# Patient Record
Sex: MALE | Race: WHITE | Employment: UNEMPLOYED | URBAN - METROPOLITAN AREA
[De-identification: names, ages, dates, MRNs, and addresses within clinical notes are randomized per-mention and may not be internally consistent; named-entity substitution may affect disease eponyms.]

---

## 2018-05-27 PROBLEM — R07.9 CHEST PAIN: Status: ACTIVE | Noted: 2018-05-27

## 2018-06-13 ENCOUNTER — OFFICE VISIT (OUTPATIENT)
Dept: ORTHOPEDIC SURGERY | Age: 43
End: 2018-06-13

## 2018-06-13 VITALS
DIASTOLIC BLOOD PRESSURE: 78 MMHG | BODY MASS INDEX: 28.12 KG/M2 | HEIGHT: 66 IN | SYSTOLIC BLOOD PRESSURE: 113 MMHG | WEIGHT: 175 LBS | HEART RATE: 87 BPM

## 2018-06-13 DIAGNOSIS — M50.00 HNP (HERNIATED NUCLEUS PULPOSUS) WITH MYELOPATHY, CERVICAL: Primary | ICD-10-CM

## 2018-06-13 PROCEDURE — 99243 OFF/OP CNSLTJ NEW/EST LOW 30: CPT | Performed by: PHYSICIAN ASSISTANT

## 2018-06-13 RX ORDER — DEXAMETHASONE 6 MG/1
6 TABLET ORAL 2 TIMES DAILY
Qty: 12 TABLET | Refills: 0 | Status: SHIPPED | OUTPATIENT
Start: 2018-06-13 | End: 2018-06-19

## 2018-06-13 RX ORDER — GABAPENTIN 300 MG/1
300 CAPSULE ORAL 3 TIMES DAILY
Qty: 90 CAPSULE | Refills: 0 | Status: SHIPPED | OUTPATIENT
Start: 2018-06-13 | End: 2018-07-31

## 2018-06-20 ENCOUNTER — TELEPHONE (OUTPATIENT)
Dept: ORTHOPEDIC SURGERY | Age: 43
End: 2018-06-20

## 2018-06-27 ENCOUNTER — TELEPHONE (OUTPATIENT)
Dept: ORTHOPEDIC SURGERY | Age: 43
End: 2018-06-27

## 2018-06-29 ENCOUNTER — TELEPHONE (OUTPATIENT)
Dept: ORTHOPEDIC SURGERY | Age: 43
End: 2018-06-29

## 2018-06-30 ENCOUNTER — HOSPITAL ENCOUNTER (OUTPATIENT)
Dept: MRI IMAGING | Age: 43
Discharge: HOME | End: 2018-06-30
Attending: PHYSICIAN ASSISTANT | Admitting: PHYSICIAN ASSISTANT

## 2018-06-30 DIAGNOSIS — M50.00 CERVICAL DISC DISORDER WITH MYELOPATHY OF CERVICAL REGION: ICD-10-CM

## 2018-06-30 DIAGNOSIS — M50.00 HNP (HERNIATED NUCLEUS PULPOSUS) WITH MYELOPATHY, CERVICAL: ICD-10-CM

## 2018-07-02 ENCOUNTER — TELEPHONE (OUTPATIENT)
Dept: ORTHOPEDIC SURGERY | Age: 43
End: 2018-07-02

## 2018-07-02 NOTE — TELEPHONE ENCOUNTER
Spoke with wife and she stated on his left arm he has numbness, tingling and swelling. She stated that she will discuss info with him tomorrow at the appt.

## 2018-07-02 NOTE — TELEPHONE ENCOUNTER
He has a left disc protrusion at C5-6 which is likely contributing to his left arm symptoms. I think he could consider an epidural injection or come in to discuss surgical options with Dr. Aminah Giordano.  Thanks

## 2018-07-03 ENCOUNTER — OFFICE VISIT (OUTPATIENT)
Dept: ORTHOPEDIC SURGERY | Age: 43
End: 2018-07-03

## 2018-07-03 VITALS
HEART RATE: 96 BPM | WEIGHT: 160 LBS | HEIGHT: 66 IN | DIASTOLIC BLOOD PRESSURE: 84 MMHG | BODY MASS INDEX: 25.71 KG/M2 | SYSTOLIC BLOOD PRESSURE: 151 MMHG

## 2018-07-03 DIAGNOSIS — M50.10 HERNIATION OF CERVICAL INTERVERTEBRAL DISC WITH RADICULOPATHY: Primary | ICD-10-CM

## 2018-07-03 PROCEDURE — 99213 OFFICE O/P EST LOW 20 MIN: CPT | Performed by: ORTHOPAEDIC SURGERY

## 2018-07-03 RX ORDER — HYDROCODONE BITARTRATE AND ACETAMINOPHEN 5; 325 MG/1; MG/1
1 TABLET ORAL EVERY 8 HOURS PRN
Qty: 21 TABLET | Refills: 0 | Status: SHIPPED | OUTPATIENT
Start: 2018-07-03 | End: 2018-08-01 | Stop reason: SDUPTHER

## 2018-07-03 NOTE — PROGRESS NOTES
History of present illness:   Mr. Magaly Singh is a pleasant 41 y.o. old male with a PMH of vertigo kindly referred by Salud Baptiste PA-C for consultation regarding Mr. Richerd Collet Mulle's neck, and left arm pain. He states the pain began insidiously about 8 weeks ago. His pain has steadily increased since then. He rates his neck pain 10/10 and shoulder and arm pain 10/10. He describes the pain as aching and stabbing. The arm pain radiates to his left shoulder blade and arm. He notes numbness and tingling in a C7 distribution. He notes weakness of his left arm. He denies, lower extremity symptoms, gait abnormality and bowel or bladder dysfunction. The pain moderately interferes with his sleep. He has tried steroids, Neurontin, chiropractic care, ice and heat. He takes Naproxen and Robaxin. He also notes retro-orbital headaches, occasional numbness in his legs and swelling this left hand    General Exam:  He is well-developed and well-nourished, is in obvious pain and alert and oriented to person, place, and time. He demonstrates appropriate mood and affect. He walks with a normal gait. HEENT:   His cervical flexion, extension, and axial rotation are moderately reduced with pain. His skin is warm and dry. He has no tenderness over his cervical spine and no obvious muscle spasm. The skin over his cervical spine is normal without surgical scar. Upper extremities:  He has 4-/5 left  strength, finger intrinsics and biceps, otherwise 5/5 strength of his interosseous muscles, wrist dorsiflexors and volarflexors, biceps, triceps, deltoids, and internal and external rotators of his shoulders, bilaterally. His biceps, triceps, bracheoradialis, quadriceps and achilles reflexes are 2+, bilaterally. Sensation is intact to light touchfrom C6 to C8. He has no clonus and negative Hargrove's bilaterally. He has swelling over the dorsum of his left hand without tenderness.   He has painless range of motion of his left wrist. Lower extremities:  Normal DTR knees, no clonus. Imaging:   I reviewed AP and lateral xray images of his cervical spine from 5/28/18. They show loss of normal cervical lordosis and mild spondylosis. I reviewed MRI images of the cervical spine in the office today. 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 He understands these risks and wishes to proceed with surgery. His questions were answered.

## 2018-07-09 ENCOUNTER — TELEPHONE (OUTPATIENT)
Dept: ORTHOPEDIC SURGERY | Age: 43
End: 2018-07-09

## 2018-07-09 NOTE — TELEPHONE ENCOUNTER
WORKING ON FMLA AND FITNESS FOR [employer] FOR[employer] FOR [employer]. WAITING FOR A REPLY FROM [employer] ([employer]) REGARDING LENGTH OF TIME OFF WORK FROM SURGERY.

## 2018-07-16 ENCOUNTER — TELEPHONE (OUTPATIENT)
Dept: ORTHOPEDIC SURGERY | Age: 43
End: 2018-07-16

## 2018-07-23 ENCOUNTER — TELEPHONE (OUTPATIENT)
Dept: ORTHOPEDIC SURGERY | Age: 43
End: 2018-07-23

## 2018-07-25 ENCOUNTER — TELEPHONE (OUTPATIENT)
Dept: ORTHOPEDIC SURGERY | Age: 43
End: 2018-07-25

## 2018-07-25 NOTE — TELEPHONE ENCOUNTER
Spoke with patient's wife and let her know that Dr. Osmani Dill approved for him to return to work with restrictions before surgery. He also said that we can fill out his paperwork for [facility] to fill the gap from him seeing [place] until Dr. Osmani Dill saw the patient to determine that surgery was necessary. Coleman Minijamari can you take care of this for me? I also added a letter in his file for him to return with restrictions before surgery.

## 2018-07-26 ENCOUNTER — TELEPHONE (OUTPATIENT)
Dept: ORTHOPEDIC SURGERY | Age: 43
End: 2018-07-26

## 2018-07-31 ENCOUNTER — OFFICE VISIT (OUTPATIENT)
Dept: CARDIOLOGY CLINIC | Age: 43
End: 2018-07-31

## 2018-07-31 ENCOUNTER — TELEPHONE (OUTPATIENT)
Dept: ORTHOPEDIC SURGERY | Age: 43
End: 2018-07-31

## 2018-07-31 VITALS
SYSTOLIC BLOOD PRESSURE: 118 MMHG | HEART RATE: 108 BPM | BODY MASS INDEX: 27.16 KG/M2 | OXYGEN SATURATION: 97 % | DIASTOLIC BLOOD PRESSURE: 80 MMHG | HEIGHT: 66 IN | WEIGHT: 169 LBS

## 2018-07-31 DIAGNOSIS — R01.1 MURMUR: ICD-10-CM

## 2018-07-31 DIAGNOSIS — M50.10 HERNIATION OF CERVICAL INTERVERTEBRAL DISC WITH RADICULOPATHY: ICD-10-CM

## 2018-07-31 DIAGNOSIS — Z72.0 NICOTINE ABUSE: ICD-10-CM

## 2018-07-31 DIAGNOSIS — Z01.810 PREOPERATIVE CARDIOVASCULAR EXAMINATION: Primary | ICD-10-CM

## 2018-07-31 PROCEDURE — 99204 OFFICE O/P NEW MOD 45 MIN: CPT | Performed by: INTERNAL MEDICINE

## 2018-07-31 PROCEDURE — 93000 ELECTROCARDIOGRAM COMPLETE: CPT | Performed by: INTERNAL MEDICINE

## 2018-07-31 NOTE — PATIENT INSTRUCTIONS
Patient Education        Heart-Healthy Diet: Care Instructions  Your Care Instructions    A heart-healthy diet has lots of vegetables, fruits, nuts, beans, and whole grains, and is low in salt. It limits foods that are high in saturated fat, such as meats, cheeses, and fried foods. It may be hard to change your diet, but even small changes can lower your risk of heart attack and heart disease. Follow-up care is a key part of your treatment and safety. Be sure to make and go to all appointments, and call your doctor if you are having problems. It's also a good idea to know your test results and keep a list of the medicines you take. How can you care for yourself at home? Watch your portions  · Learn what a serving is. A \"serving\" and a \"portion\" are not always the same thing. Make sure that you are not eating larger portions than are recommended. For example, a serving of pasta is ½ cup. A serving size of meat is 2 to 3 ounces. A 3-ounce serving is about the size of a deck of cards. Measure serving sizes until you are good at [place]" them. Keep in mind that restaurants often serve portions that are 2 or 3 times the size of one serving. · To keep your energy level up and keep you from feeling hungry, eat often but in smaller portions. · Eat only the number of calories you need to stay at a healthy weight. If you need to lose weight, eat fewer calories than your body burns (through exercise and other physical activity). Eat more fruits and vegetables  · Eat a variety of fruit and vegetables every day. Dark green, deep orange, red, or yellow fruits and vegetables are especially good for you. Examples include spinach, carrots, peaches, and berries. · Keep carrots, celery, and other veggies handy for snacks. Buy fruit that is in season and store it where you can see it so that you will be tempted to eat it. · Cook dishes that have a lot of veggies in them, such as stir-fries and soups.   Limit saturated and

## 2018-07-31 NOTE — PROGRESS NOTES
[address]      Cardiology Consult    Sg Longoria [facility]  1975 July 31, 2018        Reason for Referral: Pre-operative risk assessment/murmur. CC: \"I was told I have a murmur\"     HPI:  The patient is 41 y.o. male with no known cardiac history who presents today for a pre-operative risk assessment and evaluation of a heart murmur. He is accompanied by his wife. He reports he is having surgery on Friday for a herniated disc in his neck. He does not have a PCP. He was evaluated in the ED for his neck pain and ultimately referred to orthopedics. He states he was told he had a murmur by the ED physician. He denies any cardiac sounding complaints. He denies a history of CHF, CAD, CKD, DM, or CVA. He reports a functional status of >4 mets without symptoms. He reports occasional nonexertional brief chest pains that improve without any intervention. He denies any changes in the frequency or intensity of this atypical symptom. Patient denies exertional chest pain/pressure, dyspnea at rest, BRYANT, PND, orthopnea, palpitations, lightheadedness, weight changes, LE edema, and syncope.      Past Medical History:   Diagnosis Date    Kidney stone     Murmur     Vertigo      Past Surgical History:   Procedure Laterality Date    CYSTOSCOPY      LITHOTRIPSY      WISDOM TOOTH EXTRACTION       Family History   Problem Relation Age of Onset    Heart Disease Mother         CHF    Heart Attack Father     High Blood Pressure Father     No Known Problems Sister     No Known Problems Brother     No Known Problems Maternal Aunt     No Known Problems Maternal Uncle     No Known Problems Paternal Aunt     No Known Problems Paternal Uncle     No Known Problems Maternal Grandmother     No Known Problems Maternal Grandfather     No Known Problems Paternal Grandmother     No Known Problems Paternal Grandfather     No Known Problems Other     Anesth Problems Neg Hx     Broken Bones Neg Hx     Cancer Neg Hx place, and time. He appears well-developed and well-nourished. In no acute distress. Head: Normocephalic and atraumatic. Pupils equal and round. Neck: Neck supple. No JVP or carotid bruit appreciated. No mass and no thyromegaly present. No lymphadenopathy present. Cardiovascular: Normal rate. Exam reveals no gallop and no friction rub. II/VI holosystolic murmur loudest at the apex. Pulmonary/Chest: Effort normal and breath sounds normal. No respiratory distress. He has no wheezes, rhonchi or rales. Abdominal: Soft, non-tender. Bowel sounds are normal. He exhibits no organomegaly, mass or bruit. Extremities: No edema, cyanosis, or clubbing. Pulses are 2+ radial/dorsalis pedis/posterior tibial/carotid bilaterally. Neurological: No gross cranial nerve deficit. Coordination normal.   Skin: Skin is warm and dry. There is no rash or diaphoresis. Psychiatric: He has a normal mood and affect. His speech is normal and behavior is normal.     Lab Review:   FLP:  No results found for: TRIG, HDL, LDLCALC, LDLDIRECT, LABVLDL  BUN/Creatinine:    Lab Results   Component Value Date    BUN 18 05/27/2018    CREATININE 0.8 05/27/2018       EKG Interpretation: 7/30/18. Sinus tachycardia. Right axis deviation. Nonspecific ST-T abnormality. Image Review:   None on file. Assessment/Plan:   1) Systolic murmur. Sounds consistent with mitral regurgitation but will order an echo to further assess. 2) Pre-operative risk assessment. Patient is low cardiac risk based on RCRI score of zero. Patient's risk should not preclude him from proceeding with surgery. No additional cardiac testing is required prior to surgery as he denies any unstable or progressive cardiac sounding symptoms. The echocardiogram should not delay surgery as it would be unlikely to change recommendations. 3) Nicotine Abuse. Encouraged smoking cessation but patient is in the contemplative stage. Follow up pending cardiac echo results. Encouraged patient to establish care with a PCP. Thank you very much for allowing me to participate in the care of your patient. Please do not hesitate to contact me if you have any questions. Sincerely,  Lidia Menendez.  Yanelis Gonzalez, [address] [place] [address], [address] [place] [address] [facility]  Ph: [phone]  Fax: [phone]

## 2018-07-31 NOTE — TELEPHONE ENCOUNTER
LM for patient to return my call.   I need to speak with him because I need to cancel his surgery on Friday and reschedule him for 8/15/18

## 2018-07-31 NOTE — PROGRESS NOTES
Obstructive Sleep Apnea (TREY) Screening     Patient:  Maylin Valentin    YOB: 1975      Medical Record #:  [id number]                     Date:  7/31/2018     1. Are you a loud and/or regular snorer? []  Yes       [x] No    2. Have you been observed to gasp or stop breathing during sleep? []  Yes       [x] No    3. Do you feel tired or groggy upon awakening or do you awaken with a headache?           []  Yes       [x] No    4. Are you often tired or fatigued during the wake time hours? []  Yes       [x] No    5. Do you fall asleep sitting, reading, watching TV or driving? []  Yes       [x] No    6. Do you often have problems with memory or concentration? []  Yes       [x] No    **If patient's score is ? 3 they are considered high risk for TREY. Notify the anesthesiologist of the high risk and document in focus note. Note:  If the patient's BMI is more than 35 kg m¯² , has neck circumference > 40 cm, and/or high blood pressure the risk is greater (© American Sleep Apnea Association, 2006).

## 2018-08-01 ENCOUNTER — TELEPHONE (OUTPATIENT)
Dept: ORTHOPEDIC SURGERY | Age: 43
End: 2018-08-01

## 2018-08-01 ENCOUNTER — HOSPITAL ENCOUNTER (OUTPATIENT)
Age: 43
Discharge: HOME | End: 2018-08-01
Payer: MEDICAID

## 2018-08-01 DIAGNOSIS — R01.1 MURMUR: ICD-10-CM

## 2018-08-01 DIAGNOSIS — Z01.810 PREOPERATIVE CARDIOVASCULAR EXAMINATION: ICD-10-CM

## 2018-08-01 LAB
A/G RATIO: 1.5 (ref 1.1–2.2)
ALBUMIN SERPL-MCNC: 4.1 G/DL (ref 3.4–5)
ALP BLD-CCNC: 105 U/L (ref 40–129)
ALT SERPL-CCNC: 75 U/L (ref 10–40)
ANION GAP SERPL CALCULATED.3IONS-SCNC: 12 MMOL/L (ref 3–16)
AST SERPL-CCNC: 55 U/L (ref 15–37)
BILIRUB SERPL-MCNC: 0.4 MG/DL (ref 0–1)
BUN BLDV-MCNC: 8 MG/DL (ref 7–20)
CALCIUM SERPL-MCNC: 8.9 MG/DL (ref 8.3–10.6)
CHLORIDE BLD-SCNC: 99 MMOL/L (ref 99–110)
CHOLESTEROL, FASTING: 173 MG/DL (ref 0–199)
CO2: 26 MMOL/L (ref 21–32)
CREAT SERPL-MCNC: 0.9 MG/DL (ref 0.9–1.3)
GFR AFRICAN AMERICAN: >60
GFR NON-AFRICAN AMERICAN: >60
GLOBULIN: 2.8 G/DL
GLUCOSE BLD-MCNC: 136 MG/DL (ref 70–99)
HCT VFR BLD CALC: 44.7 % (ref 40.5–52.5)
HDLC SERPL-MCNC: 28 MG/DL (ref 40–60)
HEMOGLOBIN: 14.7 G/DL (ref 13.5–17.5)
LDL CHOLESTEROL CALCULATED: 109 MG/DL
MCH RBC QN AUTO: 28.5 PG (ref 26–34)
MCHC RBC AUTO-ENTMCNC: 32.9 G/DL (ref 31–36)
MCV RBC AUTO: 86.4 FL (ref 80–100)
PDW BLD-RTO: 15.1 % (ref 12.4–15.4)
PLATELET # BLD: 363 K/UL (ref 135–450)
PMV BLD AUTO: 8.3 FL (ref 5–10.5)
POTASSIUM SERPL-SCNC: 4.2 MMOL/L (ref 3.5–5.1)
RBC # BLD: 5.17 M/UL (ref 4.2–5.9)
SODIUM BLD-SCNC: 137 MMOL/L (ref 136–145)
TOTAL PROTEIN: 6.9 G/DL (ref 6.4–8.2)
TRIGLYCERIDE, FASTING: 182 MG/DL (ref 0–150)
TSH SERPL DL<=0.05 MIU/L-ACNC: 1.2 UIU/ML (ref 0.27–4.2)
VLDLC SERPL CALC-MCNC: 36 MG/DL
WBC # BLD: 6.6 K/UL (ref 4–11)

## 2018-08-01 PROCEDURE — 80053 COMPREHEN METABOLIC PANEL: CPT

## 2018-08-01 PROCEDURE — 36415 COLL VENOUS BLD VENIPUNCTURE: CPT

## 2018-08-01 PROCEDURE — 85027 COMPLETE CBC AUTOMATED: CPT

## 2018-08-01 PROCEDURE — 80061 LIPID PANEL: CPT

## 2018-08-01 PROCEDURE — 84443 ASSAY THYROID STIM HORMONE: CPT

## 2018-08-01 RX ORDER — HYDROCODONE BITARTRATE AND ACETAMINOPHEN 5; 325 MG/1; MG/1
1 TABLET ORAL EVERY 8 HOURS PRN
Qty: 21 TABLET | Refills: 0 | Status: SHIPPED | OUTPATIENT
Start: 2018-08-01 | End: 2018-08-09

## 2018-08-01 NOTE — TELEPHONE ENCOUNTER
with Chica Rosado about our processes and how she knows all the ins and outs of getting things retro authorized and paid, the patient's wife did finally leave.

## 2018-08-02 ENCOUNTER — TELEPHONE (OUTPATIENT)
Dept: CARDIOLOGY CLINIC | Age: 43
End: 2018-08-02

## 2018-08-03 ENCOUNTER — TELEPHONE (OUTPATIENT)
Dept: ORTHOPEDIC SURGERY | Age: 43
End: 2018-08-03

## 2018-08-03 NOTE — TELEPHONE ENCOUNTER
PER  461 HE LEFT MESSAGE ON VOICEMAIL THAT STATES PATIENT IS INELIGIBLE FOR THE MONTH OF AUGUST 2018. INFORMED SCOTT AND SHE CALLED THE PATIENT. SHE STATES PATIENT MAY HAVE TO WAIT UNTIL September WHEN HE HAS BUCKEYE. INFORMED HER THAT I CAN'T START THE CASE UNTIL HE IS ELIGIBLE AND IT TAKES 15 DAYS TO PRECERT.

## 2018-08-09 ENCOUNTER — HOSPITAL ENCOUNTER (OUTPATIENT)
Dept: NON INVASIVE DIAGNOSTICS | Age: 43
Discharge: HOME | End: 2018-08-09
Attending: INTERNAL MEDICINE | Admitting: INTERNAL MEDICINE

## 2018-08-09 DIAGNOSIS — R01.1 MURMUR: ICD-10-CM

## 2018-08-09 DIAGNOSIS — R01.1 CARDIAC MURMUR: ICD-10-CM

## 2018-08-09 LAB
LV EF: 68 %
LVEF MODALITY: NORMAL

## 2018-08-10 ENCOUNTER — TELEPHONE (OUTPATIENT)
Dept: CARDIOLOGY CLINIC | Age: 43
End: 2018-08-10

## 2018-08-10 NOTE — TELEPHONE ENCOUNTER
Dr. Edmund Holt reviewed in result note. Spoke with Audra. Heart function was normal. There is probably mitral valve prolapse but difficult to say definitively based on echo quality. Regardless, no new recommendations. Follow-up in one year or sooner if issues. Will likely repeat echo in a few years even if asymptomatic. She verbalized an understanding and states he will follow up with his PCP next week.

## 2018-08-15 ENCOUNTER — ANESTHESIA (OUTPATIENT)
Dept: OPERATING ROOM | Age: 43
DRG: 310 | End: 2018-08-15
Payer: MEDICAID

## 2018-08-15 ENCOUNTER — HOSPITAL ENCOUNTER (INPATIENT)
Age: 43
LOS: 1 days | Discharge: HOME | DRG: 310 | End: 2018-08-16
Attending: ORTHOPAEDIC SURGERY | Admitting: ORTHOPAEDIC SURGERY
Payer: MEDICAID

## 2018-08-15 ENCOUNTER — HOSPITAL ENCOUNTER (OUTPATIENT)
Dept: GENERAL RADIOLOGY | Age: 43
Discharge: HOME | End: 2018-08-15

## 2018-08-15 ENCOUNTER — ANESTHESIA EVENT (OUTPATIENT)
Dept: OPERATING ROOM | Age: 43
DRG: 310 | End: 2018-08-15
Payer: MEDICAID

## 2018-08-15 VITALS — SYSTOLIC BLOOD PRESSURE: 107 MMHG | OXYGEN SATURATION: 100 % | DIASTOLIC BLOOD PRESSURE: 71 MMHG

## 2018-08-15 DIAGNOSIS — M50.20 CERVICAL DISC HERNIATION: Primary | ICD-10-CM

## 2018-08-15 DIAGNOSIS — R52 PAIN: ICD-10-CM

## 2018-08-15 PROCEDURE — 3700000001 HC ADD 15 MINUTES (ANESTHESIA): Performed by: ORTHOPAEDIC SURGERY

## 2018-08-15 PROCEDURE — 3600000014 HC SURGERY LEVEL 4 ADDTL 15MIN: Performed by: ORTHOPAEDIC SURGERY

## 2018-08-15 PROCEDURE — 6360000002 HC RX W HCPCS: Performed by: ANESTHESIOLOGY

## 2018-08-15 PROCEDURE — 0RU307Z SUPPLEMENT CERVICAL VERTEBRAL DISC WITH AUTOLOGOUS TISSUE SUBSTITUTE, OPEN APPROACH: ICD-10-PCS | Performed by: ORTHOPAEDIC SURGERY

## 2018-08-15 PROCEDURE — 7100000000 HC PACU RECOVERY - FIRST 15 MIN: Performed by: ORTHOPAEDIC SURGERY

## 2018-08-15 PROCEDURE — 2580000003 HC RX 258: Performed by: ANESTHESIOLOGY

## 2018-08-15 PROCEDURE — 2500000003 HC RX 250 WO HCPCS: Performed by: ORTHOPAEDIC SURGERY

## 2018-08-15 PROCEDURE — 2780000010 HC IMPLANT OTHER: Performed by: ORTHOPAEDIC SURGERY

## 2018-08-15 PROCEDURE — 6360000002 HC RX W HCPCS: Performed by: ORTHOPAEDIC SURGERY

## 2018-08-15 PROCEDURE — 3700000000 HC ANESTHESIA ATTENDED CARE: Performed by: ORTHOPAEDIC SURGERY

## 2018-08-15 PROCEDURE — 2500000003 HC RX 250 WO HCPCS: Performed by: NURSE ANESTHETIST, CERTIFIED REGISTERED

## 2018-08-15 PROCEDURE — 99406 BEHAV CHNG SMOKING 3-10 MIN: CPT

## 2018-08-15 PROCEDURE — 2709999900 HC NON-CHARGEABLE SUPPLY: Performed by: ORTHOPAEDIC SURGERY

## 2018-08-15 PROCEDURE — 6370000000 HC RX 637 (ALT 250 FOR IP): Performed by: ORTHOPAEDIC SURGERY

## 2018-08-15 PROCEDURE — 3209999900 FLUORO FOR SURGICAL PROCEDURES

## 2018-08-15 PROCEDURE — 2580000003 HC RX 258: Performed by: ORTHOPAEDIC SURGERY

## 2018-08-15 PROCEDURE — 1200000000 HC SEMI PRIVATE

## 2018-08-15 PROCEDURE — 72040 X-RAY EXAM NECK SPINE 2-3 VW: CPT

## 2018-08-15 PROCEDURE — 6360000002 HC RX W HCPCS: Performed by: NURSE ANESTHETIST, CERTIFIED REGISTERED

## 2018-08-15 PROCEDURE — C1713 ANCHOR/SCREW BN/BN,TIS/BN: HCPCS | Performed by: ORTHOPAEDIC SURGERY

## 2018-08-15 PROCEDURE — 2720000010 HC SURG SUPPLY STERILE: Performed by: ORTHOPAEDIC SURGERY

## 2018-08-15 PROCEDURE — 7100000001 HC PACU RECOVERY - ADDTL 15 MIN: Performed by: ORTHOPAEDIC SURGERY

## 2018-08-15 PROCEDURE — 94664 DEMO&/EVAL PT USE INHALER: CPT

## 2018-08-15 PROCEDURE — 3600000004 HC SURGERY LEVEL 4 BASE: Performed by: ORTHOPAEDIC SURGERY

## 2018-08-15 PROCEDURE — 0RT30ZZ RESECTION OF CERVICAL VERTEBRAL DISC, OPEN APPROACH: ICD-10-PCS | Performed by: ORTHOPAEDIC SURGERY

## 2018-08-15 PROCEDURE — 94150 VITAL CAPACITY TEST: CPT

## 2018-08-15 DEVICE — DISC 6972450 PRESTIGE
Type: IMPLANTABLE DEVICE | Status: FUNCTIONAL
Brand: PRESTIGE LP™

## 2018-08-15 RX ORDER — CEPHALEXIN 500 MG/1
500 CAPSULE ORAL EVERY 6 HOURS
Qty: 2 CAPSULE | Refills: 0 | Status: SHIPPED | OUTPATIENT
Start: 2018-08-15 | End: 2018-08-21

## 2018-08-15 RX ORDER — MORPHINE SULFATE 2 MG/ML
2 INJECTION, SOLUTION INTRAMUSCULAR; INTRAVENOUS
Status: DISCONTINUED | OUTPATIENT
Start: 2018-08-15 | End: 2018-08-16 | Stop reason: HOSPADM

## 2018-08-15 RX ORDER — HYDROMORPHONE HCL 110MG/55ML
PATIENT CONTROLLED ANALGESIA SYRINGE INTRAVENOUS PRN
Status: DISCONTINUED | OUTPATIENT
Start: 2018-08-15 | End: 2018-08-15 | Stop reason: SDUPTHER

## 2018-08-15 RX ORDER — SODIUM CHLORIDE 0.9 % (FLUSH) 0.9 %
10 SYRINGE (ML) INJECTION PRN
Status: DISCONTINUED | OUTPATIENT
Start: 2018-08-15 | End: 2018-08-15 | Stop reason: HOSPADM

## 2018-08-15 RX ORDER — MIDAZOLAM HYDROCHLORIDE 1 MG/ML
INJECTION INTRAMUSCULAR; INTRAVENOUS PRN
Status: DISCONTINUED | OUTPATIENT
Start: 2018-08-15 | End: 2018-08-15 | Stop reason: SDUPTHER

## 2018-08-15 RX ORDER — SODIUM CHLORIDE 0.9 % (FLUSH) 0.9 %
10 SYRINGE (ML) INJECTION EVERY 12 HOURS SCHEDULED
Status: DISCONTINUED | OUTPATIENT
Start: 2018-08-15 | End: 2018-08-15 | Stop reason: HOSPADM

## 2018-08-15 RX ORDER — BUPIVACAINE HYDROCHLORIDE AND EPINEPHRINE 2.5; 5 MG/ML; UG/ML
INJECTION, SOLUTION EPIDURAL; INFILTRATION; INTRACAUDAL; PERINEURAL PRN
Status: DISCONTINUED | OUTPATIENT
Start: 2018-08-15 | End: 2018-08-15 | Stop reason: HOSPADM

## 2018-08-15 RX ORDER — CYCLOBENZAPRINE HCL 10 MG
10 TABLET ORAL 3 TIMES DAILY PRN
Status: DISCONTINUED | OUTPATIENT
Start: 2018-08-15 | End: 2018-08-16 | Stop reason: HOSPADM

## 2018-08-15 RX ORDER — OXYCODONE HYDROCHLORIDE AND ACETAMINOPHEN 5; 325 MG/1; MG/1
1 TABLET ORAL EVERY 4 HOURS PRN
Status: DISCONTINUED | OUTPATIENT
Start: 2018-08-15 | End: 2018-08-16 | Stop reason: HOSPADM

## 2018-08-15 RX ORDER — ONDANSETRON 2 MG/ML
4 INJECTION INTRAMUSCULAR; INTRAVENOUS EVERY 6 HOURS PRN
Status: DISCONTINUED | OUTPATIENT
Start: 2018-08-15 | End: 2018-08-16 | Stop reason: HOSPADM

## 2018-08-15 RX ORDER — LIDOCAINE HYDROCHLORIDE 10 MG/ML
0.3 INJECTION, SOLUTION EPIDURAL; INFILTRATION; INTRACAUDAL; PERINEURAL
Status: DISCONTINUED | OUTPATIENT
Start: 2018-08-15 | End: 2018-08-15 | Stop reason: HOSPADM

## 2018-08-15 RX ORDER — OXYCODONE HYDROCHLORIDE AND ACETAMINOPHEN 5; 325 MG/1; MG/1
1 TABLET ORAL PRN
Status: DISCONTINUED | OUTPATIENT
Start: 2018-08-15 | End: 2018-08-15 | Stop reason: HOSPADM

## 2018-08-15 RX ORDER — LIDOCAINE HYDROCHLORIDE 20 MG/ML
INJECTION, SOLUTION EPIDURAL; INFILTRATION; INTRACAUDAL; PERINEURAL PRN
Status: DISCONTINUED | OUTPATIENT
Start: 2018-08-15 | End: 2018-08-15 | Stop reason: SDUPTHER

## 2018-08-15 RX ORDER — HYDRALAZINE HYDROCHLORIDE 20 MG/ML
5 INJECTION INTRAMUSCULAR; INTRAVENOUS EVERY 10 MIN PRN
Status: DISCONTINUED | OUTPATIENT
Start: 2018-08-15 | End: 2018-08-15 | Stop reason: HOSPADM

## 2018-08-15 RX ORDER — OXYCODONE HYDROCHLORIDE AND ACETAMINOPHEN 5; 325 MG/1; MG/1
2 TABLET ORAL EVERY 4 HOURS PRN
Status: DISCONTINUED | OUTPATIENT
Start: 2018-08-15 | End: 2018-08-16 | Stop reason: HOSPADM

## 2018-08-15 RX ORDER — SODIUM CHLORIDE 0.9 % (FLUSH) 0.9 %
10 SYRINGE (ML) INJECTION PRN
Status: DISCONTINUED | OUTPATIENT
Start: 2018-08-15 | End: 2018-08-16 | Stop reason: HOSPADM

## 2018-08-15 RX ORDER — ONDANSETRON 2 MG/ML
INJECTION INTRAMUSCULAR; INTRAVENOUS PRN
Status: DISCONTINUED | OUTPATIENT
Start: 2018-08-15 | End: 2018-08-15 | Stop reason: SDUPTHER

## 2018-08-15 RX ORDER — CYCLOBENZAPRINE HCL 10 MG
10 TABLET ORAL 3 TIMES DAILY PRN
Qty: 40 TABLET | Refills: 1 | Status: SHIPPED | OUTPATIENT
Start: 2018-08-15 | End: 2018-08-25

## 2018-08-15 RX ORDER — DOCUSATE SODIUM 100 MG/1
100 CAPSULE, LIQUID FILLED ORAL 2 TIMES DAILY
Status: DISCONTINUED | OUTPATIENT
Start: 2018-08-15 | End: 2018-08-16 | Stop reason: HOSPADM

## 2018-08-15 RX ORDER — MEPERIDINE HYDROCHLORIDE 50 MG/ML
12.5 INJECTION INTRAMUSCULAR; INTRAVENOUS; SUBCUTANEOUS EVERY 5 MIN PRN
Status: DISCONTINUED | OUTPATIENT
Start: 2018-08-15 | End: 2018-08-15 | Stop reason: HOSPADM

## 2018-08-15 RX ORDER — LABETALOL HYDROCHLORIDE 5 MG/ML
5 INJECTION, SOLUTION INTRAVENOUS EVERY 10 MIN PRN
Status: DISCONTINUED | OUTPATIENT
Start: 2018-08-15 | End: 2018-08-15 | Stop reason: HOSPADM

## 2018-08-15 RX ORDER — SODIUM CHLORIDE, SODIUM LACTATE, POTASSIUM CHLORIDE, CALCIUM CHLORIDE 600; 310; 30; 20 MG/100ML; MG/100ML; MG/100ML; MG/100ML
INJECTION, SOLUTION INTRAVENOUS CONTINUOUS
Status: DISCONTINUED | OUTPATIENT
Start: 2018-08-15 | End: 2018-08-15

## 2018-08-15 RX ORDER — CYCLOBENZAPRINE HCL 10 MG
TABLET ORAL
Status: DISPENSED
Start: 2018-08-15 | End: 2018-08-16

## 2018-08-15 RX ORDER — DOCUSATE SODIUM 100 MG/1
100 CAPSULE, LIQUID FILLED ORAL 2 TIMES DAILY PRN
Qty: 30 CAPSULE | Refills: 1 | Status: SHIPPED | OUTPATIENT
Start: 2018-08-15

## 2018-08-15 RX ORDER — ROCURONIUM BROMIDE 10 MG/ML
INJECTION, SOLUTION INTRAVENOUS PRN
Status: DISCONTINUED | OUTPATIENT
Start: 2018-08-15 | End: 2018-08-15 | Stop reason: SDUPTHER

## 2018-08-15 RX ORDER — OXYCODONE HYDROCHLORIDE AND ACETAMINOPHEN 5; 325 MG/1; MG/1
2 TABLET ORAL PRN
Status: DISCONTINUED | OUTPATIENT
Start: 2018-08-15 | End: 2018-08-15 | Stop reason: HOSPADM

## 2018-08-15 RX ORDER — OXYCODONE HYDROCHLORIDE AND ACETAMINOPHEN 5; 325 MG/1; MG/1
TABLET ORAL
Qty: 50 TABLET | Refills: 0 | Status: SHIPPED | OUTPATIENT
Start: 2018-08-15 | End: 2018-09-15

## 2018-08-15 RX ORDER — FENTANYL CITRATE 50 UG/ML
INJECTION, SOLUTION INTRAMUSCULAR; INTRAVENOUS PRN
Status: DISCONTINUED | OUTPATIENT
Start: 2018-08-15 | End: 2018-08-15 | Stop reason: SDUPTHER

## 2018-08-15 RX ORDER — SODIUM CHLORIDE 9 MG/ML
INJECTION, SOLUTION INTRAVENOUS CONTINUOUS
Status: DISCONTINUED | OUTPATIENT
Start: 2018-08-15 | End: 2018-08-16 | Stop reason: HOSPADM

## 2018-08-15 RX ORDER — ONDANSETRON 2 MG/ML
4 INJECTION INTRAMUSCULAR; INTRAVENOUS EVERY 10 MIN PRN
Status: DISCONTINUED | OUTPATIENT
Start: 2018-08-15 | End: 2018-08-15 | Stop reason: HOSPADM

## 2018-08-15 RX ORDER — PROPOFOL 10 MG/ML
INJECTION, EMULSION INTRAVENOUS PRN
Status: DISCONTINUED | OUTPATIENT
Start: 2018-08-15 | End: 2018-08-15 | Stop reason: SDUPTHER

## 2018-08-15 RX ORDER — SODIUM CHLORIDE 0.9 % (FLUSH) 0.9 %
10 SYRINGE (ML) INJECTION EVERY 12 HOURS SCHEDULED
Status: DISCONTINUED | OUTPATIENT
Start: 2018-08-15 | End: 2018-08-16 | Stop reason: HOSPADM

## 2018-08-15 RX ADMIN — ONDANSETRON 4 MG: 2 INJECTION INTRAMUSCULAR; INTRAVENOUS at 13:01

## 2018-08-15 RX ADMIN — FENTANYL CITRATE 100 MCG: 50 INJECTION INTRAMUSCULAR; INTRAVENOUS at 11:39

## 2018-08-15 RX ADMIN — OXYCODONE HYDROCHLORIDE AND ACETAMINOPHEN 2 TABLET: 5; 325 TABLET ORAL at 23:20

## 2018-08-15 RX ADMIN — HYDROMORPHONE HYDROCHLORIDE 0.25 MG: 1 INJECTION, SOLUTION INTRAMUSCULAR; INTRAVENOUS; SUBCUTANEOUS at 14:12

## 2018-08-15 RX ADMIN — CYCLOBENZAPRINE HYDROCHLORIDE 10 MG: 10 TABLET, FILM COATED ORAL at 13:45

## 2018-08-15 RX ADMIN — Medication 2 G: at 21:21

## 2018-08-15 RX ADMIN — HYDROMORPHONE HYDROCHLORIDE 0.25 MG: 1 INJECTION, SOLUTION INTRAMUSCULAR; INTRAVENOUS; SUBCUTANEOUS at 13:40

## 2018-08-15 RX ADMIN — FENTANYL CITRATE 50 MCG: 50 INJECTION INTRAMUSCULAR; INTRAVENOUS at 11:31

## 2018-08-15 RX ADMIN — PROPOFOL 200 MG: 10 INJECTION, EMULSION INTRAVENOUS at 11:39

## 2018-08-15 RX ADMIN — SODIUM CHLORIDE, PRESERVATIVE FREE 10 ML: 5 INJECTION INTRAVENOUS at 21:32

## 2018-08-15 RX ADMIN — ROCURONIUM BROMIDE 50 MG: 10 SOLUTION INTRAVENOUS at 11:40

## 2018-08-15 RX ADMIN — MORPHINE SULFATE 2 MG: 2 INJECTION, SOLUTION INTRAMUSCULAR; INTRAVENOUS at 21:31

## 2018-08-15 RX ADMIN — SODIUM CHLORIDE, POTASSIUM CHLORIDE, SODIUM LACTATE AND CALCIUM CHLORIDE: 600; 310; 30; 20 INJECTION, SOLUTION INTRAVENOUS at 11:30

## 2018-08-15 RX ADMIN — SODIUM CHLORIDE, PRESERVATIVE FREE 10 ML: 5 INJECTION INTRAVENOUS at 21:21

## 2018-08-15 RX ADMIN — OXYCODONE HYDROCHLORIDE AND ACETAMINOPHEN 2 TABLET: 5; 325 TABLET ORAL at 18:03

## 2018-08-15 RX ADMIN — CYCLOBENZAPRINE HYDROCHLORIDE 10 MG: 10 TABLET, FILM COATED ORAL at 21:21

## 2018-08-15 RX ADMIN — ROCURONIUM BROMIDE 20 MG: 10 SOLUTION INTRAVENOUS at 12:17

## 2018-08-15 RX ADMIN — HYDROMORPHONE HYDROCHLORIDE 0.5 MG: 1 INJECTION, SOLUTION INTRAMUSCULAR; INTRAVENOUS; SUBCUTANEOUS at 15:19

## 2018-08-15 RX ADMIN — DOCUSATE SODIUM 100 MG: 100 CAPSULE, LIQUID FILLED ORAL at 21:21

## 2018-08-15 RX ADMIN — MIDAZOLAM HYDROCHLORIDE 2 MG: 2 INJECTION, SOLUTION INTRAMUSCULAR; INTRAVENOUS at 11:35

## 2018-08-15 RX ADMIN — SUGAMMADEX 200 MG: 100 INJECTION, SOLUTION INTRAVENOUS at 13:04

## 2018-08-15 RX ADMIN — HYDROMORPHONE HYDROCHLORIDE 1 MG: 2 INJECTION, SOLUTION INTRAMUSCULAR; INTRAVENOUS; SUBCUTANEOUS at 11:39

## 2018-08-15 RX ADMIN — LIDOCAINE HYDROCHLORIDE 50 MG: 20 INJECTION, SOLUTION EPIDURAL; INFILTRATION; INTRACAUDAL; PERINEURAL at 11:39

## 2018-08-15 RX ADMIN — MIDAZOLAM HYDROCHLORIDE 2 MG: 2 INJECTION, SOLUTION INTRAMUSCULAR; INTRAVENOUS at 11:30

## 2018-08-15 RX ADMIN — Medication 2 G: at 11:32

## 2018-08-15 RX ADMIN — SODIUM CHLORIDE, POTASSIUM CHLORIDE, SODIUM LACTATE AND CALCIUM CHLORIDE: 600; 310; 30; 20 INJECTION, SOLUTION INTRAVENOUS at 13:49

## 2018-08-15 ASSESSMENT — PULMONARY FUNCTION TESTS
PIF_VALUE: 17
PIF_VALUE: 18
PIF_VALUE: 17
PIF_VALUE: 18
PIF_VALUE: 0
PIF_VALUE: 17
PIF_VALUE: 23
PIF_VALUE: 17
PIF_VALUE: 16
PIF_VALUE: 17
PIF_VALUE: 17
PIF_VALUE: 16
PIF_VALUE: 0
PIF_VALUE: 17
PIF_VALUE: 18
PIF_VALUE: 17
PIF_VALUE: 18
PIF_VALUE: 20
PIF_VALUE: 5
PIF_VALUE: 18
PIF_VALUE: 17
PIF_VALUE: 19
PIF_VALUE: 17
PIF_VALUE: 0
PIF_VALUE: 17
PIF_VALUE: 18
PIF_VALUE: 16
PIF_VALUE: 22
PIF_VALUE: 23
PIF_VALUE: 17
PIF_VALUE: 18
PIF_VALUE: 18
PIF_VALUE: 16
PIF_VALUE: 2
PIF_VALUE: 0
PIF_VALUE: 17
PIF_VALUE: 16
PIF_VALUE: 17
PIF_VALUE: 16
PIF_VALUE: 17
PIF_VALUE: 17
PIF_VALUE: 1
PIF_VALUE: 1
PIF_VALUE: 18
PIF_VALUE: 16
PIF_VALUE: 17
PIF_VALUE: 18
PIF_VALUE: 17
PIF_VALUE: 18
PIF_VALUE: 16
PIF_VALUE: 17
PIF_VALUE: 16
PIF_VALUE: 17
PIF_VALUE: 16
PIF_VALUE: 17
PIF_VALUE: 0
PIF_VALUE: 17
PIF_VALUE: 16
PIF_VALUE: 18
PIF_VALUE: 17
PIF_VALUE: 1
PIF_VALUE: 21
PIF_VALUE: 0
PIF_VALUE: 17
PIF_VALUE: 18
PIF_VALUE: 17
PIF_VALUE: 18
PIF_VALUE: 16
PIF_VALUE: 15
PIF_VALUE: 1
PIF_VALUE: 17
PIF_VALUE: 16
PIF_VALUE: 13
PIF_VALUE: 17
PIF_VALUE: 1
PIF_VALUE: 17
PIF_VALUE: 22
PIF_VALUE: 17
PIF_VALUE: 15
PIF_VALUE: 18
PIF_VALUE: 17
PIF_VALUE: 1
PIF_VALUE: 16
PIF_VALUE: 0
PIF_VALUE: 17
PIF_VALUE: 16
PIF_VALUE: 17
PIF_VALUE: 17
PIF_VALUE: 18
PIF_VALUE: 17
PIF_VALUE: 18
PIF_VALUE: 17
PIF_VALUE: 17
PIF_VALUE: 18
PIF_VALUE: 17
PIF_VALUE: 1
PIF_VALUE: 15
PIF_VALUE: 18

## 2018-08-15 ASSESSMENT — PAIN DESCRIPTION - PAIN TYPE
TYPE: SURGICAL PAIN

## 2018-08-15 ASSESSMENT — PAIN - FUNCTIONAL ASSESSMENT: PAIN_FUNCTIONAL_ASSESSMENT: 0-10

## 2018-08-15 ASSESSMENT — PAIN DESCRIPTION - LOCATION
LOCATION: NECK;OTHER (COMMENT)
LOCATION: NECK
LOCATION: NECK

## 2018-08-15 ASSESSMENT — PAIN SCALES - GENERAL
PAINLEVEL_OUTOF10: 8
PAINLEVEL_OUTOF10: 6
PAINLEVEL_OUTOF10: 4
PAINLEVEL_OUTOF10: 6
PAINLEVEL_OUTOF10: 5
PAINLEVEL_OUTOF10: 7
PAINLEVEL_OUTOF10: 8
PAINLEVEL_OUTOF10: 9
PAINLEVEL_OUTOF10: 5
PAINLEVEL_OUTOF10: 7
PAINLEVEL_OUTOF10: 8

## 2018-08-15 ASSESSMENT — PAIN DESCRIPTION - DESCRIPTORS
DESCRIPTORS: CONSTANT
DESCRIPTORS: ACHING
DESCRIPTORS: SHARP

## 2018-08-15 ASSESSMENT — PAIN DESCRIPTION - ORIENTATION
ORIENTATION: POSTERIOR
ORIENTATION: POSTERIOR

## 2018-08-15 ASSESSMENT — LIFESTYLE VARIABLES: SMOKING_STATUS: 1

## 2018-08-15 ASSESSMENT — PAIN DESCRIPTION - FREQUENCY: FREQUENCY: CONTINUOUS

## 2018-08-15 NOTE — OP NOTE
Ayo  8/15/2018 1:14 PM    PATIENT NAME:          Amelia Mcintosh  YOB: 1975   MEDICAL RECORD#         [id number]  SURGERY DATE:         8/15/2018  SURGEON:                 HEATHER FRIEND                                                      OPERATIVE REPORT     PREOPERATIVE DIAGNOSIS: Herniated cervical disc C5/C6 on the left            POSTOPERATIVE DIAGNOSIS:    same    PROCEDURES PERFORMED:  1. Anterior discectomy with excision of HNP C5/C6, anterior foraminotomy C5/C6  2. Total disc arthroplasty C5-6  3. Microdissection using the operating room microscope. ANESTHESIA:  General    ESTIMATED BLOOD LOSS:  minimal    INDICATIONS FOR SURGERY:   Amelia Mcintosh is a 41 y.o. male with neck and left arm pain. The symptoms failed to respond to conservative intervention. An MRI scan was performed and this showed evidence of a disk herniation at the C5/C6 level on the left. Having failed conservative management and experiencing persistent symptoms, the patient elected to proceed ahead with the surgical option ACDF at C5/C6. DETAILS OF PROCEDURE:  The patient was brought to the operating room, placed supine on the josué table and placed under general anesthesia. A gel roll was placed under the scapula, being careful to avoid cervical extension. A donut pad was placed under the head. Wrist restraints were loosely applied to the wrists and the arms were padded and tucked at the sides. All bony prominences were inspected and padded prior to sterile draping. The skin was prepped and draped. A needle was placed in the skin over the C5/C6 area and lateral fluoroscopy showed the spine and the needle. I injected the skin with 0.5% Marcaine with epi. Using a #10 blade knife, the skin was incised in an existing transverse crease on the right side of the neck. A plane was developed between the skin and platysma muscle with scissors.  The platysma muscle was grasped with forceps and split longitudinally. The external and anterior jugular veins were identified and protected. A plane was developed deep to the platysma. The anterior border of the sternocleidomastoid was identified and blunt dissection was performed just medial to the sternocleidomastoid muscle and the carotid sheath and just lateral to the strap muscles, trachea, esophagus and thyroid. Vessels and nerves coursing transversely across that interval were identified and protected. The end of an angled retractor was placed deep to the esophagus and the structures medial to the carotid sheath were gently retracted from the midline. I palpated the spine and identified the longus coli muscles. A pin was placed near the superior endplate of what I guessed was C6 and fluoroscopy was used to confirm the level. I marked the proper disc by making a horizontal cut in the disc with a bovie. I removed the pin and used bipolar electrocautery along the medial portions of the longus coli muscles just rostral and caudal to the disc. The longus coli muscles were carefully elevated with straight and angled curettes and the blades of a blackbelt retractor were placed deep the longus coli muscles. Those blades were attached to the retractor. Using the operating microscope and a 15 blade knife I incised the edges of the annulus from the endplates. I used a straight curette to free the disc from the endplates and the disc was grasped and removed with a pituitary. Cash distraction pins were then placed in the vertebrae directly rostral and caudal to the disc and the retractor was placed. I removed the anterior inferior lip of the rostral vertebra with a #3 kerrison. I removed disc material back to the PLL with straight and angled curettes. I identified a rent in the PLL. I carefully enlarged that rent with an angled 6-0 curette. I used a #1 then a #2 kerrison to remove the PLL. The dura was identified and protected.  I removed a portion of the posterior uncus on each side and performed foraminotomies with a 6-0 curette and a #2 kerrison. A microdissector could easily be passed into the foramen and I could identify exiting nerves. Having completed the spinal cord and nerve compression, I used the midas to bur the vertebral endplates. This contoured the endplates and exposed bleeding bone. I removed the Donato pins and packed bone wax into the pin sites. I then used the Prestige II rasps and trials to determine the best size disc replacement to insert and to check the fit. A 5mm x 14mm replacement fir best on AP and LAT fluoroscopy. I cut grooves into C5 and C6 then impacted that size disc replacement into the disc space. AP and lateral fluoroscopy confirmed good position of the replacement. The Blackbelt retractor blades were removed and I examined all the structures in the wound for injury. Hemostasis was confirmed. I placed the tip of a drain over the spine and brought the second end of the drain out through a second stab wound. I closed the platysma and subcutaneous tissues with interrupted 3-0 Vicryl sutures. I then closed the skin with a  a 4-0 Vicryl subcuticular suture. Sterile dressing were then applied. The patient was extubated in the operating room and transferred to the recovery room in stable condition. There were no complications. Ceferino Salas M.D.

## 2018-08-15 NOTE — LETTER
Name: CRISTO NAVAS  : 1975  Diagnosis: Cervical HNP C5-6 with radiculopathy    Surgeon: Chaparrita Patton    DOS: 8/15/18    Procedure:  1. [id number] total disc arthroplasty C5-6

## 2018-08-15 NOTE — H&P
I have reviewed the history and physical and examined the patient and find no relevant changes. I have reviewed with the patient and/or family the risks, benefits, and alternatives to the procedure. Jefferson Josehp MD  8/15/2018 No intake/output data recorded.

## 2018-08-15 NOTE — ANESTHESIA PRE PROCEDURE
Department of Anesthesiology  Preprocedure Note       Name:  Alla Murcia   Age:  41 y.o.  :  1975                                          MRN:  [id number]         Date:  8/15/2018      Surgeon: Rico Schuster):  Linwood Godoy MD    Procedure: Procedure(s):  TOTAL DISC ARTHROPLASTY C5-6 POSSIBLE ANTERIOR DISCECTOMY AND FUSION WITH MEDTRONIC PLATES AND SCREWS     Medications prior to admission:   Prior to Admission medications    Medication Sig Start Date End Date Taking?  Authorizing Provider   Aspirin-Acetaminophen-Caffeine (EXCEDRIN PO) Take 2 tablets by mouth daily   Yes Historical Provider, MD       Current medications:    Current Facility-Administered Medications   Medication Dose Route Frequency Provider Last Rate Last Dose    ceFAZolin (ANCEF) 2 g in sterile water 20 mL IV syringe  2 g Intravenous On Call to Randy Galindo MD        NONFORMULARY 1 g  1 g Injection On Call to Randy Galindo MD        lactated ringers infusion   Intravenous Continuous Tavia Ruth MD        sodium chloride flush 0.9 % injection 10 mL  10 mL Intravenous 2 times per day Tavia Ruth MD        sodium chloride flush 0.9 % injection 10 mL  10 mL Intravenous PRN Tavia Ruth MD        lidocaine PF 1 % injection 0.3 mL  0.3 mL Intradermal Once PRN Tavia Ruth MD           Allergies:  No Known Allergies    Problem List:    Patient Active Problem List   Diagnosis Code    Chest pain R07.9       Past Medical History:        Diagnosis Date    Kidney stone     Murmur     Vertigo        Past Surgical History:        Procedure Laterality Date    CYSTOSCOPY      LITHOTRIPSY      WISDOM TOOTH EXTRACTION         Social History:    Social History   Substance Use Topics    Smoking status: Current Every Day Smoker     Packs/day: 0.50     Types: Cigarettes    Smokeless tobacco: Never Used    Alcohol use Yes      Comment: rare                                Ready to quit: Not Answered  Counseling given: Not

## 2018-08-16 VITALS
SYSTOLIC BLOOD PRESSURE: 158 MMHG | OXYGEN SATURATION: 98 % | DIASTOLIC BLOOD PRESSURE: 70 MMHG | RESPIRATION RATE: 16 BRPM | HEART RATE: 92 BPM | HEIGHT: 66 IN | BODY MASS INDEX: 25.71 KG/M2 | TEMPERATURE: 98.2 F | WEIGHT: 160 LBS

## 2018-08-16 PROCEDURE — 6370000000 HC RX 637 (ALT 250 FOR IP): Performed by: ORTHOPAEDIC SURGERY

## 2018-08-16 PROCEDURE — 6360000002 HC RX W HCPCS: Performed by: ORTHOPAEDIC SURGERY

## 2018-08-16 PROCEDURE — 2580000003 HC RX 258: Performed by: ORTHOPAEDIC SURGERY

## 2018-08-16 RX ADMIN — SODIUM CHLORIDE, PRESERVATIVE FREE 10 ML: 5 INJECTION INTRAVENOUS at 04:52

## 2018-08-16 RX ADMIN — OXYCODONE HYDROCHLORIDE AND ACETAMINOPHEN 2 TABLET: 5; 325 TABLET ORAL at 04:52

## 2018-08-16 RX ADMIN — OXYCODONE HYDROCHLORIDE AND ACETAMINOPHEN 2 TABLET: 5; 325 TABLET ORAL at 10:09

## 2018-08-16 RX ADMIN — Medication 2 G: at 04:51

## 2018-08-16 RX ADMIN — DOCUSATE SODIUM 100 MG: 100 CAPSULE, LIQUID FILLED ORAL at 10:09

## 2018-08-16 ASSESSMENT — PAIN SCALES - GENERAL
PAINLEVEL_OUTOF10: 7
PAINLEVEL_OUTOF10: 9

## 2018-08-21 ENCOUNTER — OFFICE VISIT (OUTPATIENT)
Dept: PRIMARY CARE CLINIC | Age: 43
End: 2018-08-21

## 2018-08-21 VITALS
SYSTOLIC BLOOD PRESSURE: 118 MMHG | BODY MASS INDEX: 25.23 KG/M2 | HEIGHT: 66 IN | HEART RATE: 104 BPM | WEIGHT: 157 LBS | TEMPERATURE: 98.3 F | DIASTOLIC BLOOD PRESSURE: 86 MMHG | OXYGEN SATURATION: 96 %

## 2018-08-21 DIAGNOSIS — I34.0 NON-RHEUMATIC MITRAL REGURGITATION: ICD-10-CM

## 2018-08-21 DIAGNOSIS — N52.9 ERECTILE DYSFUNCTION, UNSPECIFIED ERECTILE DYSFUNCTION TYPE: ICD-10-CM

## 2018-08-21 DIAGNOSIS — G25.0 FAMILIAL TREMOR: Primary | ICD-10-CM

## 2018-08-21 DIAGNOSIS — Z13.1 ENCOUNTER FOR SCREENING FOR DIABETES MELLITUS: ICD-10-CM

## 2018-08-21 DIAGNOSIS — F41.0 PANIC ATTACK: ICD-10-CM

## 2018-08-21 DIAGNOSIS — F51.01 PRIMARY INSOMNIA: ICD-10-CM

## 2018-08-21 PROCEDURE — 99204 OFFICE O/P NEW MOD 45 MIN: CPT | Performed by: FAMILY MEDICINE

## 2018-08-21 RX ORDER — SERTRALINE HYDROCHLORIDE 25 MG/1
25 TABLET, FILM COATED ORAL DAILY
Qty: 30 TABLET | Refills: 3 | Status: SHIPPED | OUTPATIENT
Start: 2018-08-21

## 2018-08-21 RX ORDER — TRAZODONE HYDROCHLORIDE 50 MG/1
50 TABLET ORAL NIGHTLY
Qty: 30 TABLET | Refills: 3 | Status: SHIPPED | OUTPATIENT
Start: 2018-08-21

## 2018-08-21 ASSESSMENT — PATIENT HEALTH QUESTIONNAIRE - PHQ9
SUM OF ALL RESPONSES TO PHQ9 QUESTIONS 1 & 2: 1
2. FEELING DOWN, DEPRESSED OR HOPELESS: 1
SUM OF ALL RESPONSES TO PHQ QUESTIONS 1-9: 1
1. LITTLE INTEREST OR PLEASURE IN DOING THINGS: 0
SUM OF ALL RESPONSES TO PHQ QUESTIONS 1-9: 1

## 2018-08-21 ASSESSMENT — ENCOUNTER SYMPTOMS
CHEST TIGHTNESS: 0
ANAL BLEEDING: 0
NAUSEA: 0
VOICE CHANGE: 0
VOMITING: 0
CONSTIPATION: 0
SORE THROAT: 0
COLOR CHANGE: 0
TROUBLE SWALLOWING: 0
EYE PAIN: 0
RECTAL PAIN: 0
EYE DISCHARGE: 0
SHORTNESS OF BREATH: 0
BLOOD IN STOOL: 0
EYE ITCHING: 0
SINUS PRESSURE: 0
ABDOMINAL PAIN: 0
COUGH: 0
CHOKING: 0
EYE REDNESS: 0
APNEA: 0
BACK PAIN: 0
FACIAL SWELLING: 0
WHEEZING: 0
PHOTOPHOBIA: 0

## 2018-08-21 NOTE — DISCHARGE SUMMARY
Physician Discharge Summary     Patient ID:  Moises King  [id number]  74 y.o.  1975    Admit date: 8/15/2018    Discharge date and time: 8/16/2018 12:55 PM     Admitting Physician: Dahiana Cabrera MD     Discharge Physician: Dr. Emily Goldman    Admission Diagnoses: Cervical disc herniation [M50.20]    Discharge Diagnoses: Cervical HNP    Admission Condition: good    Discharged Condition: good    Indication for Admission: Failed conservative treatment as outpatient for neck and upper extremity pain including chiropractic care and pain medications. This patient was then electively scheduled cervical disc replacement. Surgical procedure: Total disc arthroplasty C5-6    Consults: None    This patient had no postoperative complications. She had assistance for ADL's, IV and PO pain medication for pain control, her drain output was monitored and discontinued after her output was under 30cc, and was eventually DC in stable condition. Treatments: analgesia, and surgery      Disposition: home    Patient Instructions:   [unfilled]  Activity: activity as tolerated  Diet: regular diet  Wound Care: keep wound clean and dry; No lifting over 10 lbs    Follow-up with Dr. Emily Goldman in 2 weeks.     Signed:  Lis Morris  8/21/2018  2:39 PM

## 2018-08-21 NOTE — PROGRESS NOTES
arthralgias, back pain, gait problem, joint swelling, myalgias, neck pain and neck stiffness. Skin: Negative for color change, pallor, rash and wound. Neurological: Negative for dizziness, tremors, seizures, syncope, facial asymmetry, speech difficulty, weakness, light-headedness, numbness and headaches. Hematological: Negative for adenopathy. Does not bruise/bleed easily. Psychiatric/Behavioral: Negative for agitation, behavioral problems, confusion, decreased concentration, dysphoric mood, hallucinations, self-injury, sleep disturbance and suicidal ideas. The patient is not nervous/anxious and is not hyperactive. Objective:   Physical Exam   Constitutional: He is oriented to person, place, and time. He appears well-developed and well-nourished. No distress. HENT:   Head: Normocephalic and atraumatic. Right Ear: External ear normal.   Left Ear: External ear normal.   Nose: Nose normal.   Mouth/Throat: Oropharynx is clear and moist. No oropharyngeal exudate. Eyes: Pupils are equal, round, and reactive to light. Conjunctivae and EOM are normal. Right eye exhibits no discharge. Left eye exhibits no discharge. No scleral icterus. Neck: Normal range of motion. Neck supple. No JVD present. No tracheal deviation present. No thyromegaly present. Cardiovascular: Normal rate, regular rhythm, normal heart sounds and intact distal pulses. Exam reveals no friction rub. No murmur heard. Pulses:       Carotid pulses are 2+ on the right side, and 2+ on the left side. Radial pulses are 2+ on the right side, and 2+ on the left side. Femoral pulses are 2+ on the right side, and 2+ on the left side. Popliteal pulses are 2+ on the right side, and 2+ on the left side. Dorsalis pedis pulses are 2+ on the right side, and 2+ on the left side. Posterior tibial pulses are 2+ on the right side, and 2+ on the left side.    Pulmonary/Chest: Effort normal and breath sounds normal. No stridor. No respiratory distress. He has no wheezes. He has no rales. He exhibits no tenderness. Abdominal: Soft. Bowel sounds are normal. He exhibits no distension and no mass. There is no tenderness. There is no rebound and no guarding. Musculoskeletal: Normal range of motion. He exhibits no edema or tenderness. Lymphadenopathy:     He has no cervical adenopathy. Neurological: He is oriented to person, place, and time. He displays normal reflexes. No cranial nerve deficit. He exhibits normal muscle tone. Coordination normal.   Skin: Skin is warm and dry. No rash noted. He is not diaphoretic. No pallor. Psychiatric: He has a normal mood and affect. His behavior is normal. Judgment and thought content normal.   Nursing note and vitals reviewed. Assessment:      1. Familial tremor    - Yovany Martinez (Consult Only)  - TSH without Reflex; Future  - CBC Auto Differential; Future  - Comprehensive Metabolic Panel; Future    2. Primary insomnia  Discussed sleep hygiene    - traZODone (DESYREL) 50 MG tablet; Take 1 tablet by mouth nightly  Dispense: 30 tablet; Refill: 3    3. Encounter for screening for diabetes mellitus    - Glucose, Fasting; Future    4. Erectile dysfunction, unspecified erectile dysfunction type      5. Non-rheumatic mitral regurgitation      6. Panic attack  zoloft 50 mg qd  30  - TSH without Reflex; Future  - CBC Auto Differential; Future  - Comprehensive Metabolic Panel; Future    7.  ED  uro consult      Plan:          See me in one month    Jose Harper MD

## 2018-08-30 ENCOUNTER — OFFICE VISIT (OUTPATIENT)
Dept: ORTHOPEDIC SURGERY | Age: 43
End: 2018-08-30

## 2018-08-30 VITALS — BODY MASS INDEX: 25.71 KG/M2 | WEIGHT: 160 LBS | HEIGHT: 66 IN

## 2018-08-30 DIAGNOSIS — Z98.890 S/P CERVICAL DISC REPLACEMENT: Primary | ICD-10-CM

## 2018-08-30 PROCEDURE — 99024 POSTOP FOLLOW-UP VISIT: CPT | Performed by: ORTHOPAEDIC SURGERY

## 2018-08-30 NOTE — PROGRESS NOTES
Mr. Rasheed Mesa returns today 2 weeks s/p disc replacement C5-C6. He reports marked improvement of his arm symptoms. He has mild interscapular pain and swallowing discomfort. His incisions show no signs of infection. He has a normal gait and 5/5 strength of his wrist DFs/VFs and biceps/triceps bilaterally. His sensation is intact from C5 to C8 bilaterally. I reviewed AP and LAT x-rays of his cervical spine that were obtained in the office today. They show good position of his disc replacement. He will return in 6 weeks for repeat x-rays.

## 2018-10-02 DIAGNOSIS — Z13.1 ENCOUNTER FOR SCREENING FOR DIABETES MELLITUS: ICD-10-CM

## 2018-10-02 DIAGNOSIS — F41.0 PANIC ATTACK: ICD-10-CM

## 2018-10-02 DIAGNOSIS — G25.0 FAMILIAL TREMOR: ICD-10-CM

## 2018-10-02 LAB
A/G RATIO: 2.4 (ref 1.1–2.2)
ALBUMIN SERPL-MCNC: 4.5 G/DL (ref 3.4–5)
ALP BLD-CCNC: 89 U/L (ref 40–129)
ALT SERPL-CCNC: 21 U/L (ref 10–40)
ANION GAP SERPL CALCULATED.3IONS-SCNC: 12 MMOL/L (ref 3–16)
AST SERPL-CCNC: 18 U/L (ref 15–37)
BASOPHILS ABSOLUTE: 0 K/UL (ref 0–0.2)
BASOPHILS RELATIVE PERCENT: 0.7 %
BILIRUB SERPL-MCNC: <0.2 MG/DL (ref 0–1)
BUN BLDV-MCNC: 12 MG/DL (ref 7–20)
CALCIUM SERPL-MCNC: 9.2 MG/DL (ref 8.3–10.6)
CHLORIDE BLD-SCNC: 98 MMOL/L (ref 99–110)
CO2: 26 MMOL/L (ref 21–32)
CREAT SERPL-MCNC: 0.9 MG/DL (ref 0.9–1.3)
EOSINOPHILS ABSOLUTE: 0.2 K/UL (ref 0–0.6)
EOSINOPHILS RELATIVE PERCENT: 2.8 %
GFR AFRICAN AMERICAN: >60
GFR NON-AFRICAN AMERICAN: >60
GLOBULIN: 1.9 G/DL
GLUCOSE BLD-MCNC: 94 MG/DL (ref 70–99)
HCT VFR BLD CALC: 41.4 % (ref 40.5–52.5)
HEMOGLOBIN: 13.9 G/DL (ref 13.5–17.5)
LYMPHOCYTES ABSOLUTE: 2 K/UL (ref 1–5.1)
LYMPHOCYTES RELATIVE PERCENT: 31.1 %
MCH RBC QN AUTO: 29.8 PG (ref 26–34)
MCHC RBC AUTO-ENTMCNC: 33.5 G/DL (ref 31–36)
MCV RBC AUTO: 88.9 FL (ref 80–100)
MONOCYTES ABSOLUTE: 0.6 K/UL (ref 0–1.3)
MONOCYTES RELATIVE PERCENT: 9.5 %
NEUTROPHILS ABSOLUTE: 3.6 K/UL (ref 1.7–7.7)
NEUTROPHILS RELATIVE PERCENT: 55.9 %
PDW BLD-RTO: 14 % (ref 12.4–15.4)
PLATELET # BLD: 319 K/UL (ref 135–450)
PMV BLD AUTO: 8.3 FL (ref 5–10.5)
POTASSIUM SERPL-SCNC: 4.4 MMOL/L (ref 3.5–5.1)
RBC # BLD: 4.66 M/UL (ref 4.2–5.9)
SODIUM BLD-SCNC: 136 MMOL/L (ref 136–145)
TOTAL PROTEIN: 6.4 G/DL (ref 6.4–8.2)
TSH SERPL DL<=0.05 MIU/L-ACNC: 1.32 UIU/ML (ref 0.27–4.2)
WBC # BLD: 6.4 K/UL (ref 4–11)

## 2018-11-15 ENCOUNTER — TELEPHONE (OUTPATIENT)
Dept: ORTHOPEDIC SURGERY | Age: 43
End: 2018-11-15

## 2018-11-15 NOTE — TELEPHONE ENCOUNTER
Patient states that he needs a letter stating that he is fit to return to duty at work with no restrictions    He states that this is needed by tomorrow  Asking if we can e-mail the letter to him at [email or web address]     Patient can be reached at [phone]

## 2018-11-16 NOTE — TELEPHONE ENCOUNTER
Spoke with patient and let him know that we can give him a return to work note. He states that he has paperwork from his office that needs to be filled out. I gave him our fax # and he will send that over.

## 2018-11-19 ENCOUNTER — TELEPHONE (OUTPATIENT)
Dept: ORTHOPEDIC SURGERY | Age: 43
End: 2018-11-19

## 2020-11-12 NOTE — TELEPHONE ENCOUNTER
Spoke with patient and relayed results and recommendation per Dr. Hardin Patient, patient v/u Infliximab Pregnancy And Lactation Text: This medication is Pregnancy Category B and is considered safe during pregnancy. It is unknown if this medication is excreted in breast milk.

## 2020-12-27 ENCOUNTER — HOSPITAL ENCOUNTER (EMERGENCY)
Facility: HOSPITAL | Age: 45
Discharge: HOME | End: 2020-12-27
Attending: EMERGENCY MEDICINE | Admitting: EMERGENCY MEDICINE

## 2020-12-27 ENCOUNTER — APPOINTMENT (OUTPATIENT)
Dept: CT IMAGING | Facility: HOSPITAL | Age: 45
End: 2020-12-27

## 2020-12-27 VITALS
HEIGHT: 66 IN | WEIGHT: 140 LBS | OXYGEN SATURATION: 99 % | TEMPERATURE: 98.6 F | RESPIRATION RATE: 18 BRPM | SYSTOLIC BLOOD PRESSURE: 120 MMHG | BODY MASS INDEX: 22.5 KG/M2 | DIASTOLIC BLOOD PRESSURE: 88 MMHG | HEART RATE: 69 BPM

## 2020-12-27 DIAGNOSIS — R51.9 NONINTRACTABLE HEADACHE, UNSPECIFIED CHRONICITY PATTERN, UNSPECIFIED HEADACHE TYPE: Primary | ICD-10-CM

## 2020-12-27 DIAGNOSIS — M62.838 MUSCLE SPASM: ICD-10-CM

## 2020-12-27 LAB
ALBUMIN SERPL-MCNC: 4.6 G/DL (ref 3.5–5.2)
ALBUMIN/GLOB SERPL: 1.8 G/DL
ALP SERPL-CCNC: 95 U/L (ref 39–117)
ALT SERPL W P-5'-P-CCNC: 18 U/L (ref 1–41)
ANION GAP SERPL CALCULATED.3IONS-SCNC: 9.3 MMOL/L (ref 5–15)
AST SERPL-CCNC: 15 U/L (ref 1–40)
BASOPHILS # BLD AUTO: 0.05 10*3/MM3 (ref 0–0.2)
BASOPHILS NFR BLD AUTO: 0.6 % (ref 0–1.5)
BILIRUB SERPL-MCNC: 0.4 MG/DL (ref 0–1.2)
BUN SERPL-MCNC: 16 MG/DL (ref 6–20)
BUN/CREAT SERPL: 14.8 (ref 7–25)
CALCIUM SPEC-SCNC: 9.4 MG/DL (ref 8.6–10.5)
CHLORIDE SERPL-SCNC: 102 MMOL/L (ref 98–107)
CO2 SERPL-SCNC: 28.7 MMOL/L (ref 22–29)
CREAT SERPL-MCNC: 1.08 MG/DL (ref 0.76–1.27)
DEPRECATED RDW RBC AUTO: 43.8 FL (ref 37–54)
EOSINOPHIL # BLD AUTO: 0.16 10*3/MM3 (ref 0–0.4)
EOSINOPHIL NFR BLD AUTO: 2 % (ref 0.3–6.2)
ERYTHROCYTE [DISTWIDTH] IN BLOOD BY AUTOMATED COUNT: 13.6 % (ref 12.3–15.4)
GFR SERPL CREATININE-BSD FRML MDRD: 75 ML/MIN/1.73
GLOBULIN UR ELPH-MCNC: 2.6 GM/DL
GLUCOSE SERPL-MCNC: 137 MG/DL (ref 65–99)
HCT VFR BLD AUTO: 47.7 % (ref 37.5–51)
HGB BLD-MCNC: 15.9 G/DL (ref 13–17.7)
HOLD SPECIMEN: NORMAL
HOLD SPECIMEN: NORMAL
IMM GRANULOCYTES # BLD AUTO: 0.02 10*3/MM3 (ref 0–0.05)
IMM GRANULOCYTES NFR BLD AUTO: 0.3 % (ref 0–0.5)
LYMPHOCYTES # BLD AUTO: 2.57 10*3/MM3 (ref 0.7–3.1)
LYMPHOCYTES NFR BLD AUTO: 32.6 % (ref 19.6–45.3)
MAGNESIUM SERPL-MCNC: 2 MG/DL (ref 1.6–2.6)
MCH RBC QN AUTO: 28.8 PG (ref 26.6–33)
MCHC RBC AUTO-ENTMCNC: 33.3 G/DL (ref 31.5–35.7)
MCV RBC AUTO: 86.4 FL (ref 79–97)
MONOCYTES # BLD AUTO: 0.48 10*3/MM3 (ref 0.1–0.9)
MONOCYTES NFR BLD AUTO: 6.1 % (ref 5–12)
NEUTROPHILS NFR BLD AUTO: 4.6 10*3/MM3 (ref 1.7–7)
NEUTROPHILS NFR BLD AUTO: 58.4 % (ref 42.7–76)
NRBC BLD AUTO-RTO: 0 /100 WBC (ref 0–0.2)
PLATELET # BLD AUTO: 318 10*3/MM3 (ref 140–450)
PMV BLD AUTO: 10.3 FL (ref 6–12)
POTASSIUM SERPL-SCNC: 3.9 MMOL/L (ref 3.5–5.2)
PROT SERPL-MCNC: 7.2 G/DL (ref 6–8.5)
RBC # BLD AUTO: 5.52 10*6/MM3 (ref 4.14–5.8)
SODIUM SERPL-SCNC: 140 MMOL/L (ref 136–145)
TROPONIN T SERPL-MCNC: <0.01 NG/ML (ref 0–0.03)
WBC # BLD AUTO: 7.88 10*3/MM3 (ref 3.4–10.8)
WHOLE BLOOD HOLD SPECIMEN: NORMAL
WHOLE BLOOD HOLD SPECIMEN: NORMAL

## 2020-12-27 PROCEDURE — 25010000002 DIAZEPAM PER 5 MG: Performed by: EMERGENCY MEDICINE

## 2020-12-27 PROCEDURE — 99284 EMERGENCY DEPT VISIT MOD MDM: CPT

## 2020-12-27 PROCEDURE — 84484 ASSAY OF TROPONIN QUANT: CPT | Performed by: EMERGENCY MEDICINE

## 2020-12-27 PROCEDURE — 85025 COMPLETE CBC W/AUTO DIFF WBC: CPT | Performed by: EMERGENCY MEDICINE

## 2020-12-27 PROCEDURE — 80053 COMPREHEN METABOLIC PANEL: CPT | Performed by: EMERGENCY MEDICINE

## 2020-12-27 PROCEDURE — 93005 ELECTROCARDIOGRAM TRACING: CPT | Performed by: EMERGENCY MEDICINE

## 2020-12-27 PROCEDURE — 96375 TX/PRO/DX INJ NEW DRUG ADDON: CPT

## 2020-12-27 PROCEDURE — 83735 ASSAY OF MAGNESIUM: CPT | Performed by: EMERGENCY MEDICINE

## 2020-12-27 PROCEDURE — 25010000002 KETOROLAC TROMETHAMINE PER 15 MG: Performed by: EMERGENCY MEDICINE

## 2020-12-27 PROCEDURE — 70450 CT HEAD/BRAIN W/O DYE: CPT

## 2020-12-27 PROCEDURE — 96374 THER/PROPH/DIAG INJ IV PUSH: CPT

## 2020-12-27 RX ORDER — SODIUM CHLORIDE 0.9 % (FLUSH) 0.9 %
10 SYRINGE (ML) INJECTION AS NEEDED
Status: DISCONTINUED | OUTPATIENT
Start: 2020-12-27 | End: 2020-12-27 | Stop reason: HOSPADM

## 2020-12-27 RX ORDER — DIAZEPAM 5 MG/ML
2.5 INJECTION, SOLUTION INTRAMUSCULAR; INTRAVENOUS EVERY 4 HOURS PRN
Status: DISCONTINUED | OUTPATIENT
Start: 2020-12-27 | End: 2020-12-27 | Stop reason: HOSPADM

## 2020-12-27 RX ORDER — KETOROLAC TROMETHAMINE 30 MG/ML
30 INJECTION, SOLUTION INTRAMUSCULAR; INTRAVENOUS EVERY 6 HOURS PRN
Status: DISCONTINUED | OUTPATIENT
Start: 2020-12-27 | End: 2020-12-27 | Stop reason: HOSPADM

## 2020-12-27 RX ORDER — CYCLOBENZAPRINE HCL 5 MG
5 TABLET ORAL 3 TIMES DAILY PRN
Qty: 12 TABLET | Refills: 0 | Status: SHIPPED | OUTPATIENT
Start: 2020-12-27 | End: 2021-02-02

## 2020-12-27 RX ORDER — KETOROLAC TROMETHAMINE 10 MG/1
10 TABLET, FILM COATED ORAL EVERY 6 HOURS PRN
Qty: 15 TABLET | Refills: 0 | Status: SHIPPED | OUTPATIENT
Start: 2020-12-27 | End: 2021-02-02

## 2020-12-27 RX ADMIN — KETOROLAC TROMETHAMINE 30 MG: 30 INJECTION, SOLUTION INTRAMUSCULAR at 20:26

## 2020-12-27 RX ADMIN — DIAZEPAM 2.5 MG: 5 INJECTION, SOLUTION INTRAMUSCULAR; INTRAVENOUS at 20:25

## 2020-12-28 NOTE — ED PROVIDER NOTES
Subjective   45-year-old male presents to the ED with a chief complaint of headache.  The patient is complaining of a left-sided dull constant aching headache that has been present for approximately 2 weeks.  Slow onset.  No significant and worsening since onset but has not really improved.  He also notes that he has some left-sided neck pain and spasm that is causing him to have some spasm into his left jaw he says.  He denies chest pain or shortness of breath.  No cough or wheeze.  No nausea vomiting diarrhea or abdominal pain.  No change in vision or hearing.  No other complaints at this time.  Does note that he has a history of essential tremor.  He states that he is concerned of his essential tremor is making his neck hurt.  Does have a history of prior cervical surgeries.          Review of Systems   Musculoskeletal: Positive for neck pain.   Neurological: Positive for tremors, numbness and headaches.   All other systems reviewed and are negative.      Past Medical History:   Diagnosis Date   • Essential tremor    • Heart murmur    • Injury of back    • Renal disorder        No Known Allergies    Past Surgical History:   Procedure Laterality Date   • BACK SURGERY     • KIDNEY STONE SURGERY         History reviewed. No pertinent family history.    Social History     Socioeconomic History   • Marital status:      Spouse name: Not on file   • Number of children: Not on file   • Years of education: Not on file   • Highest education level: Not on file   Tobacco Use   • Smoking status: Current Every Day Smoker     Packs/day: 0.50     Types: Cigarettes   Substance and Sexual Activity   • Alcohol use: Never     Frequency: Never   • Drug use: Never   • Sexual activity: Defer           Objective   Physical Exam  Vitals signs and nursing note reviewed.   Constitutional:       General: He is not in acute distress.     Appearance: He is well-developed. He is not diaphoretic.   HENT:      Head: Normocephalic and  atraumatic.      Nose: Nose normal.   Eyes:      Conjunctiva/sclera: Conjunctivae normal.      Pupils: Pupils are equal, round, and reactive to light.   Cardiovascular:      Rate and Rhythm: Normal rate and regular rhythm.   Pulmonary:      Effort: Pulmonary effort is normal. No respiratory distress.      Breath sounds: Normal breath sounds.   Abdominal:      General: There is no distension.      Palpations: Abdomen is soft.      Tenderness: There is no abdominal tenderness.   Musculoskeletal:         General: No deformity.      Comments: Tenderness palpation to the left cervical paraspinal muscles and left upper trapezius.   Neurological:      Mental Status: He is alert and oriented to person, place, and time.      Cranial Nerves: No cranial nerve deficit.      Coordination: Coordination normal.      Gait: Gait normal.      Comments: Cranial nerves II through XII grossly intact.  NIHSS of 0.         Procedures           ED Course                                           MDM  EKG presents to the ED with headache and left neck pain/spasm.  Treated symptomatically with improvement of his symptoms.  Laboratories also reassuring.  CT head was negative for acute process.  Feel that he is appropriate for discharge with symptomatic treatment.  Feel that the headache is likely tension type related to the left-sided neck pain.      Final diagnoses:   Nonintractable headache, unspecified chronicity pattern, unspecified headache type   Muscle spasm            Kang Phillips, DO  12/27/20 7447

## 2021-01-18 ENCOUNTER — TRANSCRIBE ORDERS (OUTPATIENT)
Dept: GENERAL RADIOLOGY | Facility: HOSPITAL | Age: 46
End: 2021-01-18

## 2021-01-18 ENCOUNTER — HOSPITAL ENCOUNTER (OUTPATIENT)
Dept: GENERAL RADIOLOGY | Facility: HOSPITAL | Age: 46
Discharge: HOME | End: 2021-01-18
Admitting: FAMILY MEDICINE

## 2021-01-18 DIAGNOSIS — M54.2 NECK PAIN: ICD-10-CM

## 2021-01-18 DIAGNOSIS — M54.2 NECK PAIN: Primary | ICD-10-CM

## 2021-01-18 PROCEDURE — 72040 X-RAY EXAM NECK SPINE 2-3 VW: CPT

## 2021-02-02 ENCOUNTER — TRANSCRIBE ORDERS (OUTPATIENT)
Dept: GENERAL RADIOLOGY | Facility: HOSPITAL | Age: 46
End: 2021-02-02

## 2021-02-02 ENCOUNTER — HOSPITAL ENCOUNTER (OUTPATIENT)
Dept: GENERAL RADIOLOGY | Facility: HOSPITAL | Age: 46
Discharge: HOME | End: 2021-02-02
Admitting: FAMILY MEDICINE

## 2021-02-02 ENCOUNTER — OFFICE VISIT (OUTPATIENT)
Dept: NEUROLOGY | Facility: CLINIC | Age: 46
End: 2021-02-02

## 2021-02-02 VITALS
HEART RATE: 86 BPM | WEIGHT: 179.4 LBS | HEIGHT: 66 IN | OXYGEN SATURATION: 98 % | TEMPERATURE: 97.3 F | DIASTOLIC BLOOD PRESSURE: 80 MMHG | BODY MASS INDEX: 28.83 KG/M2 | SYSTOLIC BLOOD PRESSURE: 134 MMHG

## 2021-02-02 DIAGNOSIS — R25.1 TREMOR, PHYSIOLOGICAL: ICD-10-CM

## 2021-02-02 DIAGNOSIS — R25.3 MUSCLE FASCICULATION: Primary | ICD-10-CM

## 2021-02-02 DIAGNOSIS — M25.552 BILATERAL HIP PAIN: ICD-10-CM

## 2021-02-02 DIAGNOSIS — M25.551 BILATERAL HIP PAIN: ICD-10-CM

## 2021-02-02 DIAGNOSIS — M25.551 RIGHT HIP PAIN: Primary | ICD-10-CM

## 2021-02-02 PROCEDURE — 99243 OFF/OP CNSLTJ NEW/EST LOW 30: CPT | Performed by: NURSE PRACTITIONER

## 2021-02-02 PROCEDURE — 73521 X-RAY EXAM HIPS BI 2 VIEWS: CPT

## 2021-02-02 RX ORDER — BUSPIRONE HYDROCHLORIDE 7.5 MG/1
7.5 TABLET ORAL 2 TIMES DAILY
COMMUNITY
Start: 2021-01-11 | End: 2021-10-18

## 2021-02-02 RX ORDER — TRAZODONE HYDROCHLORIDE 50 MG/1
TABLET ORAL
COMMUNITY
Start: 2021-01-25 | End: 2021-10-18

## 2021-02-02 RX ORDER — PRIMIDONE 50 MG/1
TABLET ORAL
Qty: 77 TABLET | Refills: 0 | Status: SHIPPED | OUTPATIENT
Start: 2021-02-02 | End: 2021-02-23

## 2021-02-02 NOTE — PROGRESS NOTES
New Neurology Patient Office Visit      Patient Name: Bereket Rangel    Referring Physician: No ref. provider found    Chief Complaint:    Chief Complaint   Patient presents with   • Tremors     New Patient here for evaluation of Tremors       History of Present Illness: Bereket Rangel is a 46 y.o. male who is here today to establish care with Neurology.  He was referred for evaluation of tremors and reported muscle twitching.  Tremor since childhood, now worsening. 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    Subjective      Review of Systems:   Review of Systems   Eyes: Negative.    Respiratory: Negative.    Cardiovascular: Negative.    Genitourinary: Negative.    Musculoskeletal: Positive for neck pain.   Allergic/Immunologic: Negative.    Neurological: Positive for tremors.   Hematological: Negative.    Psychiatric/Behavioral: Positive for sleep disturbance and stress. The patient is nervous/anxious.        Past Medical History:   Past Medical History:   Diagnosis Date   • Anxiety    • Dizziness    • Essential tremor    • Headache, tension-type    • Heart murmur    • Injury of back    • Renal disorder      Past Surgical History:   Past Surgical History:   Procedure Laterality Date   • BACK SURGERY     • KIDNEY STONE SURGERY       Family History:   Family History   Problem  "Relation Age of Onset   • Hypertension Mother    • Neuropathy Mother    • Hypertension Father    • Hypertension Brother    • Diabetes Paternal Grandfather        Social History:   Social History     Socioeconomic History   • Marital status:      Spouse name: Not on file   • Number of children: Not on file   • Years of education: Not on file   • Highest education level: Not on file   Tobacco Use   • Smoking status: Current Every Day Smoker     Packs/day: 0.50     Types: Cigarettes   • Smokeless tobacco: Never Used   Substance and Sexual Activity   • Alcohol use: Never     Frequency: Never   • Drug use: Never   • Sexual activity: Defer       Medications:     Current Outpatient Medications:   •  busPIRone (BUSPAR) 7.5 MG tablet, Take 7.5 mg by mouth 2 (Two) Times a Day., Disp: , Rfl:   •  traZODone (DESYREL) 50 MG tablet, , Disp: , Rfl:   •  primidone (MYSOLINE) 50 MG tablet, Take 1 tablet by mouth Every Evening for 3 days, THEN 2 tablets Every Evening for 7 days, THEN 2 tablets Every Evening for 30 days., Disp: 77 tablet, Rfl: 0    Allergies:   Allergies   Allergen Reactions   • Antihistamines, Chlorpheniramine-Type Anxiety       Objective     Physical Exam:  Vital Signs:   Vitals:    02/02/21 1058   BP: 134/80   BP Location: Right arm   Patient Position: Sitting   Cuff Size: Adult   Pulse: 86   Temp: 97.3 °F (36.3 °C)   SpO2: 98%   Weight: 81.4 kg (179 lb 6.4 oz)   Height: 167.6 cm (66\")   PainSc:   3   PainLoc: Arm       Physical Exam  Vitals signs and nursing note reviewed.   Eyes:      Extraocular Movements: EOM normal.      Pupils: Pupils are equal, round, and reactive to light.   Neck:      Musculoskeletal: Normal range of motion.   Cardiovascular:      Rate and Rhythm: Regular rhythm.      Heart sounds: Murmur present. Systolic murmur present with a grade of 1/6.      Comments:   1/6 murmur at RUSB  Skin:     Comments:   Diaphoretic     Neurological:      General: No focal deficit present.      Mental " Status: He is oriented to person, place, and time.      Coordination: Finger-Nose-Finger Test, Heel to Shin Test and Romberg Test normal.      Gait: Gait is intact. Tandem walk normal.      Deep Tendon Reflexes: Strength normal.      Reflex Scores:       Bicep reflexes are 3+ on the right side and 3+ on the left side.       Brachioradialis reflexes are 2+ on the right side and 2+ on the left side.       Patellar reflexes are 3+ on the right side and 3+ on the left side.       Achilles reflexes are 2+ on the right side and 2+ on the left side.  Psychiatric:         Mood and Affect: Mood is anxious.         Speech: Speech is rapid and pressured.         Thought Content: Thought content normal.      Comments:   Loud speech  Rocking back and forth while talking to examiner       Neurologic Exam     Mental Status   Oriented to person, place, and time.   Attention: normal. Concentration: normal.   Level of consciousness: alert  Knowledge: consistent with education.   Normal comprehension.     Cranial Nerves     CN II   Visual fields full to confrontation.   Visual acuity: normal    CN III, IV, VI   Pupils are equal, round, and reactive to light.  Extraocular motions are normal.   Right pupil: Shape: regular. Reactivity: brisk.   Left pupil: Shape: regular. Reactivity: brisk.   Diplopia: none  Ophthalmoparesis: none  Upgaze: normal  Downgaze: normal    CN V   Facial sensation intact.     CN VII   Facial expression full, symmetric.     CN VIII   CN VIII normal.     CN IX, X   CN IX normal.   CN X normal.     CN XI   CN XI normal.     CN XII   CN XII normal.     Motor Exam   Muscle bulk: normal  Overall muscle tone: normal    Strength   Strength 5/5 throughout.     Sensory Exam   Light touch normal.     Gait, Coordination, and Reflexes     Gait  Gait: normal    Coordination   Romberg: negative  Finger to nose coordination: normal  Heel to shin coordination: normal  Tandem walking coordination: normal    Tremor   Resting  tremor: absent  Intention tremor: absent  Action tremor: left arm and right arm    Reflexes   Right brachioradialis: 2+  Left brachioradialis: 2+  Right biceps: 3+  Left biceps: 3+  Right patellar: 3+  Left patellar: 3+  Right achilles: 2+  Left achilles: 2+  Right plantar: normal  Left plantar: normal  Right Liang: absent  Left Liang: absent  Right ankle clonus: absent  Left ankle clonus: absent     No muscle fasciculation observed on exam today    January 2021 CSPINE XRAYS     FINDINGS: 3 views of the cervical spine were obtained. The prevertebral  soft tissues are normal. There is no subluxation or fracture.    Postsurgical changes are present at the C5/6 level with an  intervertebral disc spacer in place. Facet arthropathy is noted at the  C4/5 and C5/6 levels.      IMPRESSION:  Postsurgical change in the lower cervical spine with no  hardware complications.      2019 LUMBAR MRI ([facility])  MRI LUMBAR SPINE WITH AND WITHOUT CONTRAST, 1/5/2019 9:03 PM     CLINICAL HISTORY: -left leg given out, VERY LOW SUSPICION, clinical request to  evaluate for epidural abscess    COMPARISON: Radiographs from 1/5/2019    PROCEDURE COMMENTS: Multiplanar multiecho MR imaging of the lumbar spine with  and without gadolinium. 10 mL Multihance given.    FINDINGS:     There are 5 lumbar vertebral bodies on radiographs. Vertebral bodies demonstrate  normal signal, height, and alignment. Conus medullaris terminates at the L1-2  level.     Level by level analysis:    L1/L2: Unremarkable.    L2/L3: Unremarkable.    L3/L4: No significant disc bulge, protrusion, or herniation. There are mild to  moderate bilateral hypertrophic facet changes and mild ligamentum flavum  thickening. No significant central canal or neural foraminal stenosis.    L4/L5: There are similar findings to those at L3-4.    L5/S1: There is mild disc bulge with mild superimposed broad-based disc  protrusion, extending from the right central region to the right  foraminal  region. This causes mild right neural foraminal stenosis an mild asymmetric  narrowing of the right lateral recess. No high-grade neural compression is  identified however. Remainder of central canal is patent. The left neural  foramen is patent. There are mild to moderate bilateral hypertrophic facet  changes.    On the oblique images through the sacrum, there is asymmetric edema signal  anterior to the left sacroiliac joint, within the included left iliacus muscle.  On the postcontrast imaging, there is corresponding enhancement anterior to the  left sacroiliac joint, within the included iliacus muscle. No abnormal fluid is  identified within the sacroiliac joint itself and no enhancement within the  included superior left sacral iliac joint is identified on axial postcontrast  imaging. No abnormal enhancement is identified within the lumbar spine.  Specifically, there is no evidence of epidural abscess.    Impression   1. Partially included asymmetric edema signal and enhancement within the  included left iliacus muscle, anterior to the left sacral iliac joint. These  findings are suggestive of nonspecific myositis and could be infectious,  inflammatory, or posttraumatic.    2. No acute abnormality is identified within the lumbar spine. Specifically,  there is no evidence of epidural abscess.    3. There is a mild right-sided disc bulge at L5-S1 which causes mild asymmetric  stenosis of the right lateral recess and right neural foramen. However, no  high-grade neural compression is identified in the lumbar spine.    Results Review:   I have reviewed the patient's other medical records to include, labs, radiology and referrals.   I reviewed the patient's other test results and agree with the interpretation  Previous records and imaging from OSH reviewed    Assessment / Plan      Assessment/Plan:   Diagnoses and all orders for this visit:    1. Muscle fasciculation (Primary)  -     EMG & Nerve Conduction  Test; Future    2. Tremor, physiological  -     EMG & Nerve Conduction Test; Future  -     primidone (MYSOLINE) 50 MG tablet; Take 1 tablet by mouth Every Evening for 3 days, THEN 2 tablets Every Evening for 7 days, THEN 2 tablets Every Evening for 30 days.  Dispense: 77 tablet; Refill: 0    Patient reports experiencing persistent tremor which has been present since childhood, and more recently muscle fasciculation.  Per review of outside hospital records and imaging, patient has history of substance use disorder including methamphetamine and IV drug use (patient does not mention this today).  He does have history of ACDF, hyperreflexia noted on extremities today. I have ordered EMG of all extremities to assess for possible causes of muscle fasciculation as well as cervical nerve root pathology.  No muscle fasciculation observed on exam today.  I have explained that tremor can be worsened by anxiety, patient admits that this is true for him and his anxiety is poorly managed.  He reports that he is seeing mental health.  Declined psychiatry referral today.  He will begin taking primidone every evening for relief of tremor.  May also consider beta-blocker, this was deferred at this time as patient states he is quite physically active.    Follow Up:   Return in about 3 months (around 5/2/2021).     JACINTO Royal  [facility]   AS THE PROVIDER, I PERSONALLY WORE PPE DURING ENTIRE FACE TO FACE ENCOUNTER IN CLINIC WITH THE PATIENT. PATIENT ALSO WORE PPE DURING ENTIRE FACE TO FACE ENCOUNTER EXCEPT FOR A MAX OF 30 SECONDS DURING NEUROLOGICAL EVALUATION OF CRANIAL NERVES AND THEN MASK WAS PLACED BACK OVER PATIENT FACE FOR REMAINDER OF VISIT. I WASHED MY HANDS BEFORE AND AFTER VISIT.    Please note that portions of this note may have been completed with a voice recognition program. Efforts were made to edit the dictations, but occasionally words are mistranscribed.

## 2021-02-02 NOTE — PATIENT INSTRUCTIONS
Begin taking primidone as prescribed. Please call us for a refill when you finish this prescription. If it is not making you too tired, we can add in a daytime dose.   Tremor  A tremor is trembling or shaking that you cannot control. Most tremors affect the hands or arms. Tremors can also affect the head, vocal cords, face, and other parts of the body. There are many types of tremors. Common types include:  · Essential tremor. These usually occur in people older than 40. It may run in families and can happen in otherwise healthy people.  · Resting tremor. These occur when the muscles are at rest, such as when your hands are resting in your lap. People with Parkinson's disease often have resting tremors.  · Postural tremor. These occur when you try to hold a pose, such as keeping your hands outstretched.  · Kinetic tremor. These occur during purposeful movement, such as trying to touch a finger to your nose.  · Task-specific tremor. These may occur when you perform certain tasks such as writing, speaking, or standing.  · Psychogenic tremor. These dramatically lessen or disappear when you are distracted. They can happen in people of all ages.  Some types of tremors have no known cause. Tremors can also be a symptom of nervous system problems (neurological disorders) that may occur with aging. Some tremors go away with treatment, while others do not.  Follow these instructions at home:  Lifestyle         · Limit alcohol intake to no more than 1 drink a day for nonpregnant women and 2 drinks a day for men. One drink equals 12 oz of beer, 5 oz of wine, or 1½ oz of hard liquor.  · Do not use any products that contain nicotine or tobacco, such as cigarettes and e-cigarettes. If you need help quitting, ask your health care provider.  · Avoid extreme heat and extreme cold.  · Limit your caffeine intake, as told by your health care provider.  · Try to get 8 hours of sleep each night.  · Find ways to manage your stress, such  as meditation or yoga.  General instructions  · Take over-the-counter and prescription medicines only as told by your health care provider.  · Keep all follow-up visits as told by your health care provider. This is important.  Contact a health care provider if you:  · Develop a tremor after starting a new medicine.  · Have a tremor along with other symptoms such as:  ? Numbness.  ? Tingling.  ? Pain.  ? Weakness.  · Notice that your tremor gets worse.  · Notice that your tremor interferes with your day-to-day life.  Summary  · A tremor is trembling or shaking that you cannot control.  · Most tremors affect the hands or arms.  · Some types of tremors have no known cause. Others may be a symptom of nervous system problems (neurological disorders).  · Make sure you discuss any tremors you have with your health care provider.  This information is not intended to replace advice given to you by your health care provider. Make sure you discuss any questions you have with your health care provider.  Document Revised: 11/30/2018 Document Reviewed: 10/18/2018  Elsevier Patient Education © 2020 [employer]

## 2021-02-04 ENCOUNTER — TELEPHONE (OUTPATIENT)
Dept: NEUROLOGY | Facility: CLINIC | Age: 46
End: 2021-02-04

## 2021-02-04 NOTE — TELEPHONE ENCOUNTER
ARLENE ([facility])  [phone]    ARLENE WAS JUST WANTING TO GET AN UPDATE ON THE PRE-AUTH  FOR THE PT'S EMG THAT WAS ORDERED ON 02/2. PLEASE GIVE THEM A CALL BACK TO DISCUSS THIS.

## 2021-02-05 ENCOUNTER — HOSPITAL ENCOUNTER (OUTPATIENT)
Dept: NEUROLOGY | Facility: HOSPITAL | Age: 46
Discharge: HOME | End: 2021-02-05
Admitting: NURSE PRACTITIONER

## 2021-02-05 ENCOUNTER — TELEPHONE (OUTPATIENT)
Dept: NEUROLOGY | Facility: CLINIC | Age: 46
End: 2021-02-05

## 2021-02-05 DIAGNOSIS — R25.1 TREMOR, PHYSIOLOGICAL: ICD-10-CM

## 2021-02-05 DIAGNOSIS — R25.3 MUSCLE FASCICULATION: ICD-10-CM

## 2021-02-05 PROCEDURE — 95912 NRV CNDJ TEST 11-12 STUDIES: CPT

## 2021-02-05 PROCEDURE — 95886 MUSC TEST DONE W/N TEST COMP: CPT

## 2021-02-23 ENCOUNTER — OFFICE VISIT (OUTPATIENT)
Dept: ORTHOPEDIC SURGERY | Facility: CLINIC | Age: 46
End: 2021-02-23

## 2021-02-23 VITALS — HEIGHT: 66 IN | RESPIRATION RATE: 18 BRPM | TEMPERATURE: 96.8 F | BODY MASS INDEX: 29.6 KG/M2 | WEIGHT: 184.2 LBS

## 2021-02-23 DIAGNOSIS — M54.50 LUMBAR PAIN WITH RADIATION DOWN BOTH LEGS: ICD-10-CM

## 2021-02-23 DIAGNOSIS — M25.552 PAIN OF BOTH HIP JOINTS: Primary | ICD-10-CM

## 2021-02-23 DIAGNOSIS — M25.859 FEMORAL ACETABULAR IMPINGEMENT: ICD-10-CM

## 2021-02-23 DIAGNOSIS — M79.604 LUMBAR PAIN WITH RADIATION DOWN BOTH LEGS: ICD-10-CM

## 2021-02-23 DIAGNOSIS — M25.551 PAIN OF BOTH HIP JOINTS: Primary | ICD-10-CM

## 2021-02-23 DIAGNOSIS — M79.605 LUMBAR PAIN WITH RADIATION DOWN BOTH LEGS: ICD-10-CM

## 2021-02-23 DIAGNOSIS — M48.061 SPINAL STENOSIS OF LUMBAR REGION, UNSPECIFIED WHETHER NEUROGENIC CLAUDICATION PRESENT: ICD-10-CM

## 2021-02-23 PROCEDURE — 99203 OFFICE O/P NEW LOW 30 MIN: CPT | Performed by: PHYSICIAN ASSISTANT

## 2021-02-23 RX ORDER — CYCLOBENZAPRINE HCL 10 MG
10 TABLET ORAL NIGHTLY PRN
Qty: 30 TABLET | Refills: 0 | Status: SHIPPED | OUTPATIENT
Start: 2021-02-23 | End: 2021-10-18

## 2021-02-23 RX ORDER — NAPROXEN 250 MG/1
250 TABLET ORAL 2 TIMES DAILY PRN
COMMUNITY
End: 2021-02-23 | Stop reason: ALTCHOICE

## 2021-02-23 RX ORDER — CYCLOBENZAPRINE HCL 10 MG
TABLET ORAL
COMMUNITY
Start: 2021-02-03 | End: 2021-02-23 | Stop reason: SDUPTHER

## 2021-02-23 NOTE — PROGRESS NOTES
Subjective   Patient ID: Bereket Rangel is a 46 y.o. right hand dominant male  Pain of the Left Hip (Reports bilateral hip pain that started in 2019. [facility]. 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 Reports he has had no movement at times, unable to stand. ) and Pain of the Right Hip         History of Present Illness    Patient presents as a new patient with complaints of bilateral hip pain located to the lateral aspects as well as posterior aspects of the buttocks.  Patient does have low back pain with radiation of his pain into his lower extremities.  He does have numbness along the inner aspect of his thighs.  He has tried taking Aleve, warm heat and muscle relaxers in the past.  The symptoms have been ongoing since 2019.  He recalls an incident when he woke up and was unable to bear weight to his hips.  This lasted approximately 8 weeks.  He denies recent infection.  Denies fever or chills.    Past Medical History:   Diagnosis Date   • Anxiety    • Dizziness    • Essential tremor    • Headache, tension-type    • Heart murmur    • Injury of back    • Renal disorder         Past Surgical History:   Procedure Laterality Date   • KIDNEY STONE SURGERY     • NECK SURGERY         Family History   Problem Relation Age of Onset   • Hypertension Mother    • Neuropathy Mother    • Hypertension Father    • Hypertension Brother    • Diabetes Paternal Grandfather        Social History     Socioeconomic History   • Marital status:      Spouse name: Not on file   • Number of children: Not on file   • Years of education: Not on file   • Highest education level: Not on file   Occupational History   • Occupation: Referee     Employer: SELF-EMPLOYED     Comment: 2019   Tobacco Use   • Smoking status: Current Every Day Smoker     Packs/day: 0.50     Types: Cigarettes   • Smokeless tobacco: Never Used   Substance and Sexual Activity   • Alcohol use: Never     Frequency: Never   • Drug use: Never   • Sexual activity:  "Defer   Social History Narrative    Right roach dominant         Current Outpatient Medications:   •  busPIRone (BUSPAR) 7.5 MG tablet, Take 7.5 mg by mouth 2 (Two) Times a Day., Disp: , Rfl:   •  cyclobenzaprine (FLEXERIL) 10 MG tablet, Take 1 tablet by mouth At Night As Needed for Muscle Spasms., Disp: 30 tablet, Rfl: 0  •  oxaprozin (Daypro) 600 MG tablet, Take 2 tablets by mouth Daily., Disp: 60 tablet, Rfl: 0  •  traZODone (DESYREL) 50 MG tablet, , Disp: , Rfl:     Allergies   Allergen Reactions   • Antihistamines, Chlorpheniramine-Type Anxiety       Review of Systems   Constitutional: Negative for fever.   HENT: Negative for dental problem and voice change.    Eyes: Negative for visual disturbance.   Respiratory: Negative for shortness of breath.    Cardiovascular: Negative for chest pain.   Gastrointestinal: Negative for abdominal pain.   Genitourinary: Negative for dysuria.   Musculoskeletal: Positive for arthralgias (Bilateral hip pain). Negative for gait problem and joint swelling.   Skin: Negative for rash.   Neurological: Negative for speech difficulty.   Hematological: Does not bruise/bleed easily.   Psychiatric/Behavioral: Negative for confusion.       I have reviewed the medical and surgical history, family history, social history, medications, and/or allergies, and the review of systems of this report.    Objective   Temp 96.8 °F (36 °C) (Skin)   Resp 18   Ht 167.6 cm (66\")   Wt 83.6 kg (184 lb 3.2 oz)   BMI 29.73 kg/m²    Physical Exam  Vitals signs and nursing note reviewed.   Constitutional:       Appearance: Normal appearance.   Pulmonary:      Effort: Pulmonary effort is normal.   Musculoskeletal:      Right hip: He exhibits normal range of motion, no bony tenderness and no deformity.      Left hip: He exhibits normal range of motion, no swelling, no crepitus and no deformity.   Neurological:      Mental Status: He is alert and oriented to person, place, and time.   Psychiatric:         " Behavior: Behavior normal.       Back Exam     Tenderness   The patient is experiencing tenderness in the sacroiliac and lumbar.    Muscle Strength   Right Quadriceps:  4/5   Left Quadriceps:  4/5   Right Hamstrings:  4/5   Left Hamstrings:  4/5     Tests   Straight leg raise right: positive at 70 deg  Straight leg raise left: positive at 70 deg           Extremity DVT signs are negative on physical exam with negative Elsa sign, no calf pain, no palpable cords and no skin tone change   Neurologic Exam     Mental Status   Oriented to person, place, and time.     Motor Exam     Strength   Right quadriceps: 4/5  Left quadriceps: 4/5  Right hamstrin/5  Left hamstrin/5    Sensory Exam   Sensory deficit distribution on right: L3  Sensory deficit distribution on left: L3             Assessment/Plan   Independent Review of Radiographic Studies:    X-ray lumbar spine 2 view performed in the office for the evaluation of lumbar pain.  No comparison films available to me.  No acute fracture.  Does appear to be neuroforaminal narrowing from L3-L5  X-ray of the bilateral hip 2 view performed in the office for evaluation of hip pain.  No comparison films available reviewed.  There does appear to be thickening along the cortex of the femoral head and neck junction     Procedures       Diagnoses and all orders for this visit:    1. Pain of both hip joints (Primary)  -     XR Hips Bilateral With or Without Pelvis 3-4 View; Future  -     XR Spine Lumbar 2 or 3 View; Future  -     MRI Lumbar Spine Without Contrast  -     MRI Hip Left Without Contrast  -     MRI hip right wo contrast  -     oxaprozin (Daypro) 600 MG tablet; Take 2 tablets by mouth Daily.  Dispense: 60 tablet; Refill: 0  -     cyclobenzaprine (FLEXERIL) 10 MG tablet; Take 1 tablet by mouth At Night As Needed for Muscle Spasms.  Dispense: 30 tablet; Refill: 0    2. Lumbar pain with radiation down both legs  -     XR Spine Lumbar 2 or 3 View; Future  -     MRI  Lumbar Spine Without Contrast  -     MRI hip right wo contrast  -     oxaprozin (Daypro) 600 MG tablet; Take 2 tablets by mouth Daily.  Dispense: 60 tablet; Refill: 0  -     cyclobenzaprine (FLEXERIL) 10 MG tablet; Take 1 tablet by mouth At Night As Needed for Muscle Spasms.  Dispense: 30 tablet; Refill: 0    3. Spinal stenosis of lumbar region, unspecified whether neurogenic claudication present  -     MRI Lumbar Spine Without Contrast  -     oxaprozin (Daypro) 600 MG tablet; Take 2 tablets by mouth Daily.  Dispense: 60 tablet; Refill: 0  -     cyclobenzaprine (FLEXERIL) 10 MG tablet; Take 1 tablet by mouth At Night As Needed for Muscle Spasms.  Dispense: 30 tablet; Refill: 0    4. Femoral acetabular impingement  -     MRI Hip Left Without Contrast  -     MRI hip right wo contrast  -     oxaprozin (Daypro) 600 MG tablet; Take 2 tablets by mouth Daily.  Dispense: 60 tablet; Refill: 0  -     cyclobenzaprine (FLEXERIL) 10 MG tablet; Take 1 tablet by mouth At Night As Needed for Muscle Spasms.  Dispense: 30 tablet; Refill: 0       Orthopedic activities reviewed and patient expressed appreciation  Discussion of orthopedic goals  Risk, benefits, and merits of treatment alternatives reviewed with the patient and questions answered  Ice, heat, and/or modalities as beneficial    Recommendations/Plan:  Exercise, medications, injections, other patient advice, and return appointment as noted.  Patient is encouraged to call or return for any issues or concerns.    Follow-up after MRIs  Patient agreeable to call or return sooner for any concerns.               EMR Dragon-transcription disclaimer:  This encounter note is an electronic transcription of spoken language to printed text.  Electronic transcription of spoken language may permit erroneous or at times nonsensical words or phrases to be inadvertently transcribed.  Although I have reviewed the note for such errors, some may still exist

## 2021-03-11 ENCOUNTER — HOSPITAL ENCOUNTER (OUTPATIENT)
Dept: MRI IMAGING | Facility: HOSPITAL | Age: 46
End: 2021-03-11

## 2021-03-11 ENCOUNTER — APPOINTMENT (OUTPATIENT)
Dept: MRI IMAGING | Facility: HOSPITAL | Age: 46
End: 2021-03-11

## 2021-03-23 ENCOUNTER — TELEPHONE (OUTPATIENT)
Dept: ORTHOPEDIC SURGERY | Facility: CLINIC | Age: 46
End: 2021-03-23

## 2021-03-23 NOTE — TELEPHONE ENCOUNTER
I spoke with pt to inform his MRI's (mallorie hip, lumbar) have been denied due to not having 6 weeks PT, he states he himself appealed that decision yesterday. He will inform of results, he may have to have 6 weeks PT ordered and completed 1st

## 2021-03-24 ENCOUNTER — HOSPITAL ENCOUNTER (OUTPATIENT)
Dept: MRI IMAGING | Facility: HOSPITAL | Age: 46
End: 2021-03-24

## 2021-03-24 ENCOUNTER — APPOINTMENT (OUTPATIENT)
Dept: MRI IMAGING | Facility: HOSPITAL | Age: 46
End: 2021-03-24

## 2021-09-10 ENCOUNTER — TELEPHONE (OUTPATIENT)
Dept: ORTHOPEDIC SURGERY | Facility: CLINIC | Age: 46
End: 2021-09-10

## 2021-09-10 DIAGNOSIS — M79.604 LUMBAR PAIN WITH RADIATION DOWN BOTH LEGS: ICD-10-CM

## 2021-09-10 DIAGNOSIS — M25.552 PAIN OF BOTH HIP JOINTS: Primary | ICD-10-CM

## 2021-09-10 DIAGNOSIS — M25.551 PAIN OF BOTH HIP JOINTS: Primary | ICD-10-CM

## 2021-09-10 DIAGNOSIS — M54.50 LUMBAR PAIN WITH RADIATION DOWN BOTH LEGS: ICD-10-CM

## 2021-09-10 DIAGNOSIS — M25.551 ARTHRALGIA OF RIGHT HIP: ICD-10-CM

## 2021-09-10 DIAGNOSIS — M79.605 LUMBAR PAIN WITH RADIATION DOWN BOTH LEGS: ICD-10-CM

## 2021-09-10 DIAGNOSIS — M25.552 ARTHRALGIA OF LEFT HIP: ICD-10-CM

## 2021-09-10 DIAGNOSIS — M48.061 SPINAL STENOSIS OF LUMBAR REGION, UNSPECIFIED WHETHER NEUROGENIC CLAUDICATION PRESENT: ICD-10-CM

## 2021-09-10 NOTE — TELEPHONE ENCOUNTER
Patient was told he needed PT before his MRI would be approved, he stated that his insurance company told him today that he could be approved because he took flexeril. Put in a new order for MRI and told him to return after he has the MRI.

## 2021-10-13 ENCOUNTER — TELEPHONE (OUTPATIENT)
Dept: NEUROLOGY | Facility: CLINIC | Age: 46
End: 2021-10-13

## 2021-10-13 ENCOUNTER — HOSPITAL ENCOUNTER (OUTPATIENT)
Dept: MRI IMAGING | Facility: HOSPITAL | Age: 46
Discharge: HOME | End: 2021-10-13

## 2021-10-13 ENCOUNTER — TELEPHONE (OUTPATIENT)
Dept: ORTHOPEDIC SURGERY | Facility: CLINIC | Age: 46
End: 2021-10-13

## 2021-10-13 DIAGNOSIS — M25.551 PAIN OF BOTH HIP JOINTS: ICD-10-CM

## 2021-10-13 DIAGNOSIS — M25.552 ARTHRALGIA OF LEFT HIP: ICD-10-CM

## 2021-10-13 DIAGNOSIS — M54.50 LUMBAR PAIN WITH RADIATION DOWN BOTH LEGS: ICD-10-CM

## 2021-10-13 DIAGNOSIS — M25.552 PAIN OF BOTH HIP JOINTS: ICD-10-CM

## 2021-10-13 DIAGNOSIS — M79.604 LUMBAR PAIN WITH RADIATION DOWN BOTH LEGS: ICD-10-CM

## 2021-10-13 DIAGNOSIS — M48.061 SPINAL STENOSIS OF LUMBAR REGION, UNSPECIFIED WHETHER NEUROGENIC CLAUDICATION PRESENT: ICD-10-CM

## 2021-10-13 DIAGNOSIS — M79.605 LUMBAR PAIN WITH RADIATION DOWN BOTH LEGS: ICD-10-CM

## 2021-10-13 DIAGNOSIS — M25.551 ARTHRALGIA OF RIGHT HIP: ICD-10-CM

## 2021-10-13 PROCEDURE — 72148 MRI LUMBAR SPINE W/O DYE: CPT

## 2021-10-13 PROCEDURE — 73721 MRI JNT OF LWR EXTRE W/O DYE: CPT

## 2021-10-13 NOTE — TELEPHONE ENCOUNTER
Provider: TAZ  Caller: BARBARA  Phone Number: [phone]   Reason for Call: PT WANTS TO KNOW IF HE CAN GET A CALL ABOUT HIS MRI RESULTS OR IF HE NEEDS TO MAKE AN APPT TO COME IN     PT IS UNSURE IF HIS PHONES GOING TO BE WORKING.

## 2021-10-13 NOTE — TELEPHONE ENCOUNTER
Provider: CLEM CEE    Caller: PATIENT    Relationship to Patient: [facility]    Pharmacy: [facility]    Phone Number: [phone]    Reason for Call: PATIENT STATES HAS BEEN HAVING HEADACHES EVERY DAY FOR LAST DAY. STATES HE HAS NEVER HAD THEM BEFORE. HAVING TWITCHING IN FACE/EYE AND ALL OVER BODY, WAKING UP IN MIDDLE OF NIGHT WITH SPASMS. I ADVISED HIM HE SHOULD GO TO ER BUT HE DECLINED AND SAID THEY WOULD JUST TELL HIM TO GO SEE HIS PROVIDER. I MADE FOLLOW UP BUT ITS NOT UNTIL December. HE SAID HE DOES NOT HAVE THAT KIND OF TIME TO WAIT. I ATTEMPTED WARM TRANSFER BUT WAS UNABLE  TO REACH OFFICE AT THIS TIME. I AGAIN ADVISED PATIENT HE SHOULD GO TO ER BUT HE DECLINED. PLEASE ADVISE.     When was the patient last seen: 2-2-21

## 2021-10-18 ENCOUNTER — TELEPHONE (OUTPATIENT)
Dept: CARDIOLOGY | Facility: CLINIC | Age: 46
End: 2021-10-18

## 2021-10-18 ENCOUNTER — OFFICE VISIT (OUTPATIENT)
Dept: ORTHOPEDIC SURGERY | Facility: CLINIC | Age: 46
End: 2021-10-18

## 2021-10-18 VITALS — WEIGHT: 199.4 LBS | HEIGHT: 66 IN | RESPIRATION RATE: 18 BRPM | BODY MASS INDEX: 32.05 KG/M2

## 2021-10-18 DIAGNOSIS — S73.192A TEAR OF LEFT ACETABULAR LABRUM, INITIAL ENCOUNTER: ICD-10-CM

## 2021-10-18 DIAGNOSIS — M79.604 LUMBAR PAIN WITH RADIATION DOWN BOTH LEGS: Primary | ICD-10-CM

## 2021-10-18 DIAGNOSIS — S73.191A TEAR OF RIGHT ACETABULAR LABRUM, INITIAL ENCOUNTER: ICD-10-CM

## 2021-10-18 DIAGNOSIS — M79.605 LUMBAR PAIN WITH RADIATION DOWN BOTH LEGS: Primary | ICD-10-CM

## 2021-10-18 DIAGNOSIS — M51.36 DDD (DEGENERATIVE DISC DISEASE), LUMBAR: ICD-10-CM

## 2021-10-18 DIAGNOSIS — M54.50 LUMBAR PAIN WITH RADIATION DOWN BOTH LEGS: Primary | ICD-10-CM

## 2021-10-18 DIAGNOSIS — M62.838 MUSCLE SPASM OF BOTH LOWER LEGS: ICD-10-CM

## 2021-10-18 PROCEDURE — 99213 OFFICE O/P EST LOW 20 MIN: CPT | Performed by: PHYSICIAN ASSISTANT

## 2021-10-18 RX ORDER — BACLOFEN 10 MG/1
10 TABLET ORAL 3 TIMES DAILY
Qty: 15 TABLET | Refills: 0 | Status: SHIPPED | OUTPATIENT
Start: 2021-10-18 | End: 2021-11-02

## 2021-10-18 NOTE — TELEPHONE ENCOUNTER
----- Message from Bereket Rangel sent at 10/16/2021 12:47 PM EDT -----  Regarding: Periodic pains and in chest and facial spasms  Is it possible these 2 symptoms are related to a heart condition?

## 2021-10-18 NOTE — TELEPHONE ENCOUNTER
Grecia Read, am I looking at this right?  I don't see where this patient has ever been evaluated by Dr. Rendon or [facility].  If that's the case, he should be seen by PCP or if he wishes to self-refer and get established with a cardiologist, that's fine.  Unfortunately, this is something cannot be diagnosed/addressed with a telephone encounter.

## 2021-10-18 NOTE — TELEPHONE ENCOUNTER
Pt was advised to contact his PCP or go to the ER since he has never been seen here before. Pt was also scheduled and Appt.

## 2021-10-18 NOTE — PROGRESS NOTES
Subjective   Patient ID: Bereket Rangel is a 46 y.o. right hand dominant male  Follow-up of the Left Hip (MRI results. States he is doing better and has been stretching a lot. Has been having muscle spasms in his legs.) and Follow-up of the Right Hip         History of Present Illness    Patient presents to review MRI results of bilateral hip as well as lumbar spine.  He states the hips are not as problematic as his lower back.  He does develop lower back arthralgia that often radiates into the bilateral posterior lower extremities with associated lower leg spasms.  He states the spasms do occur without warning and are abrupt but only last for seconds.  He feels that he does live an active lifestyle but cannot participate with his regular activities due to the above-mentioned symptoms.  Past Medical History:   Diagnosis Date   • Anxiety    • Dizziness    • Essential tremor    • Headache, tension-type    • Heart murmur    • Injury of back    • Renal disorder         Past Surgical History:   Procedure Laterality Date   • KIDNEY STONE SURGERY     • NECK SURGERY         Family History   Problem Relation Age of Onset   • Hypertension Mother    • Neuropathy Mother    • Hypertension Father    • Hypertension Brother    • Diabetes Paternal Grandfather        Social History     Socioeconomic History   • Marital status:    Tobacco Use   • Smoking status: Current Every Day Smoker     Packs/day: 0.50     Types: Cigarettes   • Smokeless tobacco: Never Used   Vaping Use   • Vaping Use: Never used   Substance and Sexual Activity   • Alcohol use: Never   • Drug use: Never   • Sexual activity: Defer         Current Outpatient Medications:   •  baclofen (LIORESAL) 10 MG tablet, Take 1 tablet by mouth 3 (Three) Times a Day., Disp: 15 tablet, Rfl: 0    Allergies   Allergen Reactions   • Antihistamines, Chlorpheniramine-Type Anxiety       Review of Systems   Constitutional: Negative for fever.   HENT: Negative for dental  "problem and voice change.    Eyes: Negative for visual disturbance.   Respiratory: Negative for shortness of breath.    Cardiovascular: Negative for chest pain.   Gastrointestinal: Negative for abdominal pain.   Genitourinary: Negative for dysuria.   Musculoskeletal: Positive for arthralgias (bilateral hip). Negative for gait problem and joint swelling.   Skin: Negative for rash.   Neurological: Negative for speech difficulty.   Hematological: Does not bruise/bleed easily.   Psychiatric/Behavioral: Negative for confusion.        I have reviewed the medical and surgical history, family history, social history, medications, and/or allergies, and the review of systems of this report.    Objective   Resp 18   Ht 167.6 cm (65.98\")   Wt 90.4 kg (199 lb 6.4 oz)   BMI 32.20 kg/m²    Physical Exam  Vitals and nursing note reviewed.   Constitutional:       Appearance: Normal appearance.   Pulmonary:      Effort: Pulmonary effort is normal.   Musculoskeletal:      Lumbar back: Tenderness present. No bony tenderness. Negative right straight leg raise test and negative left straight leg raise test.      Right hip: Normal range of motion. Normal strength.      Left hip: Normal range of motion. Decreased strength.   Neurological:      Mental Status: He is alert and oriented to person, place, and time.   Psychiatric:         Behavior: Behavior normal.       Right Hip Exam     Tenderness   The patient is experiencing tenderness in the posterior.    Muscle Strength   Abduction: 5/5   Adduction: 5/5   Flexion: 5/5     Tests   GARRY: negative  Fadir:  Negative FADIR test    Other   Pulse: present      Left Hip Exam     Tenderness   The patient is experiencing tenderness in the posterior.    Muscle Strength   Abduction: 4/5   Adduction: 4/5   Flexion: 4/5     Tests   GARRY: negative  Fadir:  Negative FADIR test    Other   Pulse: present      Back Exam     Tests   Straight leg raise right: negative  Straight leg raise left: " negative           Extremity DVT signs are negative on physical exam with negative Elsa sign, no calf pain, no palpable cords and no skin tone change   Neurologic Exam     Mental Status   Oriented to person, place, and time.              Assessment/Plan   Independent Review of Radiographic Studies:    No new imaging done today.  Study Result    Narrative & Impression   PROCEDURE: MRI LUMBAR SPINE WO CONTRAST-     HISTORY: Low back pain, progressive neurologic deficit; M48.061-Spinal  stenosis, lumbar region without neurogenic claudication; M54.50-Low back  pain, unspecified; M79.604-Pain in right leg; M79.605-Pain in left leg     TECHNIQUE: Multiplanar multisequence imaging of the lumbar spine was  performed without intravenous contrast.     FINDINGS: The lumbar vertebral bodies maintain a normal height,  alignment and signal intensity. The posterior elements are intact  throughout.      At the L4/5 level there is facet arthropathy, however there is no  significant spinal stenosis or neural foraminal narrowing.     At the L5/S1 level there is a broad-based disc bulge and mild facet  arthropathy, however there is no significant spinal stenosis or neural  foraminal narrowing.     The spinal cord terminates at the L1 level. No conus lesions are  present. The paraspinal soft tissues are unremarkable.     IMPRESSION:  Degenerative change in the lower lumbar spine without  significant spinal stenosis or neural foraminal narrowing.     This report was finalized on 10/13/2021 7:57 AM by Sandhya Reed M.D..     PROCEDURE: MRI HIP RIGHT WO CONTRAST-     HISTORY:  Hip pain, chronic, impingement suspected, xray done;  M25.551-Pain in right hip; M25.552-Pain in left hip; M25.551-Pain in  right hip     COMPARISON:  None .     TECHNIQUE: Multiplanar multisequence imaging of the hip was performed  without the use of intravenous contrast.     FINDINGS: The sagittal images are degraded by motion artifact. Bone  marrow signal  is homogeneous with no evidence of abnormal edema to  suggest a stress reaction or fracture. There is no joint effusion. There  is abnormal signal in the superior labrum best appreciated on coronal  images nine, 14 and 15 suspicious for a labral tear. The adductor  origins are normal. There is no evidence of iliopsoas or greater  trochanteric bursitis. The fat plane surrounding the sciatic nerve are  preserved. Limited images of the intrapelvic soft tissues are  unremarkable..     IMPRESSION:  Abnormal signal in the superior labrum suspicious for labral  tear.     This report was finalized on 10/13/2021 7:55 AM by Sandhya Reed M.D..  Narrative & Impression   PROCEDURE: MRI HIP LEFT WO CONTRAST-     HISTORY:  Hip pain, chronic, impingement suspected, xray done;  M25.551-Pain in right hip; M25.552-Pain in left hip; M25.552-Pain in  left hip     COMPARISON:  None .     TECHNIQUE: Multiplanar multisequence imaging of the hip was performed  without the use of intravenous contrast.     FINDINGS: Bone marrow signal is homogeneous with no evidence of abnormal  edema to suggest a stress reaction or fracture. There is no joint  effusion. There is abnormal signal in the superior labrum best  appreciated on coronal image 10 suspicious for a labral tear. The  adductor origins are normal. There is no evidence of iliopsoas or  greater trochanteric bursitis. The fat plane surrounding the sciatic  nerve are preserved. Limited images of the intrapelvic soft tissues is  unremarkable.     IMPRESSION:  Abnormal signal in the superior labrum suspicious for tear.     This report was finalized on 10/13/2021 7:56 AM by Sandhya Reed M.D..       Procedures       Diagnoses and all orders for this visit:    1. Lumbar pain with radiation down both legs (Primary)  -     Ambulatory Referral to Physical Therapy Ortho, Evaluate and treat; Heat, Electrotherapy; Strengthening (core abdominal strengthening)  -     Ambulatory Referral to  "Neurosurgery  -     baclofen (LIORESAL) 10 MG tablet; Take 1 tablet by mouth 3 (Three) Times a Day.  Dispense: 15 tablet; Refill: 0    2. DDD (degenerative disc disease), lumbar  -     Ambulatory Referral to Physical Therapy Ortho, Evaluate and treat; Heat, Electrotherapy; Strengthening (core abdominal strengthening)  -     Ambulatory Referral to Neurosurgery  -     baclofen (LIORESAL) 10 MG tablet; Take 1 tablet by mouth 3 (Three) Times a Day.  Dispense: 15 tablet; Refill: 0    3. Muscle spasm of both lower legs  -     baclofen (LIORESAL) 10 MG tablet; Take 1 tablet by mouth 3 (Three) Times a Day.  Dispense: 15 tablet; Refill: 0    4. Tear of left acetabular labrum, initial encounter    5. Tear of right acetabular labrum, initial encounter       Discussion of orthopedic goals  Risk, benefits, and merits of treatment alternatives reviewed with the patient and questions answered  Reduced physical activity as appropriate  Weight bearing parameters reviewed  Ice, heat, and/or modalities as beneficial    Recommendations/Plan:  Exercise, medications, injections, other patient advice, and return appointment as noted.  Patient is encouraged to call or return for any issues or concerns.    We discussed the treatment options for bilateral hip labral tear which included fluoroscopy guided hip injections, formal physical therapy versus referral to hip subspecialist for additional treatment options which include but not limited to hip arthroscopies.  He politely declines the referral to hip sub- specialist.    He does inquire about a referral to a spine doctor for fear that his symptoms could worsen and eventually he is worried he may \"not be able to walk\"  In the meantime, I do think it would be a good idea to enroll him in formal physical therapy as I think this would be an appropriate initial treatment regimen for core abdominal and lumbar strengthening  Patient agreeable to call or return sooner for any concerns.       "       TARYN Dragon-transcription disclaimer:  This encounter note is an electronic transcription of spoken language to printed text.  Electronic transcription of spoken language may permit erroneous or at times nonsensical words or phrases to be inadvertently transcribed.  Although I have reviewed the note for such errors, some may still exist

## 2021-11-02 ENCOUNTER — OFFICE VISIT (OUTPATIENT)
Dept: NEUROSURGERY | Facility: CLINIC | Age: 46
End: 2021-11-02

## 2021-11-02 VITALS — WEIGHT: 208.2 LBS | HEIGHT: 66 IN | BODY MASS INDEX: 33.46 KG/M2 | TEMPERATURE: 97.8 F

## 2021-11-02 DIAGNOSIS — M54.9 MECHANICAL BACK PAIN: Primary | ICD-10-CM

## 2021-11-02 DIAGNOSIS — Z98.1 HISTORY OF FUSION OF CERVICAL SPINE: ICD-10-CM

## 2021-11-02 DIAGNOSIS — M51.36 DDD (DEGENERATIVE DISC DISEASE), LUMBAR: ICD-10-CM

## 2021-11-02 DIAGNOSIS — M47.819 FACET ARTHROPATHY: ICD-10-CM

## 2021-11-02 PROCEDURE — 99203 OFFICE O/P NEW LOW 30 MIN: CPT | Performed by: NEUROLOGICAL SURGERY

## 2021-11-02 NOTE — PROGRESS NOTES
Patient: Bereket Rangel  : 1975    Primary Care Provider: Spike Stack MD    Requesting Provider: As above        History    Chief Complaint: Low back and bilateral lower extremity pain.    History of Present Illness: Mr. 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  He has had ongoing spasms throughout his arms and legs for some time.  He has had hyperactive reflexes since childhood.  His main complaint is low back pain that involves his hips.  This has been present since 2019.  He complains of rather global spasms throughout his legs.  Symptoms are largely constant.  He has some mild numbness in his feet.  He has no bowel or bladder dysfunction.  He has taken NSAIDs to no avail.  Physical therapy is scheduled for later this month.  He has put on a good deal of weight.    Review of Systems   Constitutional: Negative for activity change, appetite change, chills, diaphoresis, fatigue, fever and unexpected weight change.   HENT: Negative for congestion, dental problem, drooling, ear discharge, ear pain, facial swelling, hearing loss, mouth sores, nosebleeds, postnasal drip, rhinorrhea, sinus pressure, sinus pain, sneezing, sore throat, tinnitus, trouble swallowing and voice change.    Eyes: Positive for discharge. Negative for photophobia, pain, redness, itching and visual disturbance.   Respiratory: Negative for apnea, cough, choking, chest tightness, shortness of breath, wheezing and stridor.    Cardiovascular: Negative for chest pain, palpitations and leg swelling.   Gastrointestinal: Negative for abdominal distention, abdominal pain, anal bleeding, blood in stool, constipation, diarrhea, nausea, rectal pain and vomiting.   Endocrine: Negative for cold intolerance, heat intolerance, polydipsia, polyphagia and polyuria.   Genitourinary: Negative for decreased urine volume, difficulty urinating, dysuria, enuresis, flank pain, frequency,  "genital sores, hematuria and urgency.   Musculoskeletal: Positive for back pain, gait problem, neck pain and neck stiffness. Negative for arthralgias, joint swelling and myalgias.   Skin: Negative for color change, pallor, rash and wound.   Allergic/Immunologic: Negative for environmental allergies, food allergies and immunocompromised state.   Neurological: Positive for tremors, weakness and headaches. Negative for dizziness, seizures, syncope, facial asymmetry, speech difficulty, light-headedness and numbness.   Hematological: Negative for adenopathy. Does not bruise/bleed easily.   Psychiatric/Behavioral: Positive for sleep disturbance. Negative for agitation, behavioral problems, confusion, decreased concentration, dysphoric mood, hallucinations, self-injury and suicidal ideas. The patient is nervous/anxious. The patient is not hyperactive.    All other systems reviewed and are negative.      The patient's past medical history, past surgical history, family history, and social history have been reviewed at length in the electronic medical record.    Physical Exam:   Temp 97.8 °F (36.6 °C)   Ht 167.6 cm (65.98\")   Wt 94.4 kg (208 lb 3.2 oz)   BMI 33.62 kg/m²   CONSTITUTIONAL: Patient is well-nourished, pleasant and appears stated age.  CV: Heart regular rate and rhythm without murmur, rub, or gallop.  PULMONARY: Lungs are clear to ascultation.  MUSCULOSKELETAL:  Neck tenderness to palpation is not observed.   ROM in the neck is normal.  Straight leg raising is negative.  Anish signs are negative.  NEUROLOGICAL:  Orientation, memory, attention span, language function, and cognition have been examined and are intact.  Strength is intact in the upper and lower extremities to direct testing.  Muscle tone is normal throughout.  Station and gait are normal.  Sensation is intact to light touch testing throughout.  Deep tendon reflexes are 3+ and symmetrical.  Prakash's Sign is slightly positive on the left. No " clonus is elicited.  Coordination is intact.      Medical Decision Making    Data Review:   (All imaging studies were personally reviewed unless stated otherwise)  MRI of the lumbar spine dated 10/13/2021 demonstrates some modest degenerative disc disease.  There is some central disc bulging at L5-S1.  There is no canal compromise.  There is some subtle facet arthropathy.    Diagnosis:   The patient harbors some mechanical low back pain.  I do not see evidence of a radiculopathy or neurogenic claudication.    Treatment Options:   The patient has physical therapy scheduled for later this month.  I would push ahead in that regard.  Currently, there is no role for surgical intervention.       Diagnosis Plan   1. Mechanical back pain     2. DDD (degenerative disc disease), lumbar     3. Facet arthropathy     4. History of fusion of cervical spine         Scribed for Adam Toro MD by GRISELDA Gaytan 11/2/2021 15:30 EDT      I, Dr. Toro, personally performed the services described in the documentation, as scribed in my presence, and it is both accurate and complete.

## 2022-01-10 ENCOUNTER — TELEPHONE (OUTPATIENT)
Dept: CARDIOLOGY | Facility: CLINIC | Age: 47
End: 2022-01-10

## 2022-11-23 ENCOUNTER — OFFICE VISIT (OUTPATIENT)
Dept: CARDIOLOGY | Facility: CLINIC | Age: 47
End: 2022-11-23

## 2022-11-23 VITALS
RESPIRATION RATE: 18 BRPM | HEIGHT: 66 IN | OXYGEN SATURATION: 98 % | DIASTOLIC BLOOD PRESSURE: 78 MMHG | BODY MASS INDEX: 30.7 KG/M2 | SYSTOLIC BLOOD PRESSURE: 122 MMHG | HEART RATE: 88 BPM | WEIGHT: 191 LBS

## 2022-11-23 DIAGNOSIS — R01.1 CARDIAC MURMUR: Primary | ICD-10-CM

## 2022-11-23 PROCEDURE — 93000 ELECTROCARDIOGRAM COMPLETE: CPT | Performed by: INTERNAL MEDICINE

## 2022-11-23 PROCEDURE — 99244 OFF/OP CNSLTJ NEW/EST MOD 40: CPT | Performed by: INTERNAL MEDICINE

## 2022-11-23 RX ORDER — SILDENAFIL 25 MG/1
TABLET, FILM COATED ORAL
COMMUNITY
Start: 2022-09-19

## 2022-11-23 NOTE — PROGRESS NOTES
[facility] Cardiology OP Consult Note    Bereket Rangel  [id number]  2022    Referred By: Spike Stack MD    Chief Complaint: Reestablish cardiac care    History of Present Illness:   Mr. Bereket Rangel is a 47 y.o. male who presents to the Cardiology Clinic to reestablish cardiac care.  The patient reports a history of a cardiac murmur, which was previously evaluated with an echocardiogram several years ago in [place].  At that time, he reports a mild valvulopathy, and 1 year follow-up was recommended.  He presents today to reestablish cardiac care given his history of valvulopathy.  Currently, the patient reports he is active, walking 5 to 10 miles for exercise on a regular basis.  He denies any significant exertional chest pain or chest discomfort.  No exertional dyspnea.  No significant palpitations.  No history of orthopnea, PND, or lower extremity swelling.  His only other complaint today is ED, and is planning for repeat evaluation by urology.  No other specific complaints.    Past Cardiac Testin. Last Coronary Angio: None  2. Prior Stress Testing: None  3. Last Echo: Records pending  4. Prior Holter Monitor: None    Review of Systems:   Review of Systems   Constitutional: Negative for activity change, appetite change, chills, diaphoresis, fatigue, fever, unexpected weight gain and unexpected weight loss.   Eyes: Negative for blurred vision and double vision.   Respiratory: Negative for cough, chest tightness, shortness of breath and wheezing.    Cardiovascular: Negative for chest pain, palpitations and leg swelling.   Gastrointestinal: Negative for abdominal pain, anal bleeding, blood in stool and GERD.   Endocrine: Negative for cold intolerance and heat intolerance.   Genitourinary: Positive for erectile dysfunction. Negative for hematuria.   Neurological: Negative for dizziness, syncope, weakness and light-headedness.   Hematological: Does not bruise/bleed  "easily.   Psychiatric/Behavioral: Negative for depressed mood and stress. The patient is not nervous/anxious.        Past Medical History:   Past Medical History:   Diagnosis Date   • Anxiety    • Dizziness    • Essential tremor    • Headache, tension-type    • Heart murmur    • Injury of back    • Renal disorder        Past Surgical History:   Past Surgical History:   Procedure Laterality Date   • KIDNEY STONE SURGERY     • NECK SURGERY  2018    C5-6 ACDF       Family History:   Family History   Problem Relation Age of Onset   • Hypertension Mother    • Neuropathy Mother    • Heart disease Mother    • Hypertension Father    • Hypertension Brother    • Diabetes Paternal Grandfather        Social History:   Social History     Socioeconomic History   • Marital status:    Tobacco Use   • Smoking status: Every Day     Packs/day: 0.25     Years: 10.00     Pack years: 2.50     Types: Cigarettes   • Smokeless tobacco: Never   Vaping Use   • Vaping Use: Never used   Substance and Sexual Activity   • Alcohol use: Never   • Drug use: Never   • Sexual activity: Defer       Medications:     Current Outpatient Medications:   •  sildenafil (VIAGRA) 25 MG tablet, TAKE 1 TABLET BY MOUTH ONCE DAILY AS NEEDED APPROXIMATELY 1 HOUR BEFORE SEXUAL ACTIVITY, Disp: , Rfl:     Allergies:   Allergies   Allergen Reactions   • Antihistamines, Chlorpheniramine-Type Anxiety       Physical Exam:  Vital Signs:   Vitals:    11/23/22 1037   BP: 122/78   BP Location: Right arm   Patient Position: Sitting   Pulse: 88   Resp: 18   SpO2: 98%   Weight: 86.6 kg (191 lb)   Height: 167.6 cm (66\")       Physical Exam  Constitutional:       General: He is not in acute distress.     Appearance: Normal appearance. He is not diaphoretic.   HENT:      Head: Normocephalic and atraumatic.   Cardiovascular:      Rate and Rhythm: Normal rate and regular rhythm.      Comments: Soft 1/6 systolic murmur over the left sternal border  Pulmonary:      Effort: " Pulmonary effort is normal. No respiratory distress.      Breath sounds: Normal breath sounds. No stridor. No wheezing, rhonchi or rales.   Abdominal:      General: Bowel sounds are normal. There is no distension.      Palpations: Abdomen is soft.      Tenderness: There is no abdominal tenderness. There is no guarding or rebound.   Musculoskeletal:         General: No swelling. Normal range of motion.      Cervical back: Neck supple. No tenderness.   Skin:     General: Skin is warm and dry.   Neurological:      General: No focal deficit present.      Mental Status: He is alert and oriented to person, place, and time.   Psychiatric:         Mood and Affect: Mood normal.         Behavior: Behavior normal.         Results Review:   I reviewed the patient's new clinical results.  I personally viewed and interpreted the patient's EKG/Telemetry data      ECG 12 Lead    Date/Time: 11/23/2022 10:56 AM  Performed by: Bryant Rendon MD  Authorized by: Bryant Rendon MD   Comparison: not compared with previous ECG   Rhythm: sinus rhythm  Rate: normal  QRS axis: normal    Clinical impression: normal ECG            Assessment / Plan:     1. Cardiac murmur   -- Soft systolic murmur noted on exam today  -- Appears to be asymptomatic  -- Reportedly told he had a mild valvulopathy on prior echocardiogram, will obtain records for review  -- Repeat echocardiogram for surveillance  -- Follow-up in 1 year, sooner if required      Follow Up:   Return in about 1 year (around 11/23/2023).      Thank you for allowing me to participate in the care of your patient. Please to not hesitate to contact me with additional questions or concerns.     JANET Rendon MD  Interventional Cardiology   11/23/2022  10:41 EST

## 2023-03-02 ENCOUNTER — HOSPITAL ENCOUNTER (OUTPATIENT)
Dept: GENERAL RADIOLOGY | Facility: HOSPITAL | Age: 48
Discharge: HOME | End: 2023-03-02
Admitting: FAMILY MEDICINE
Payer: MEDICAID

## 2023-03-02 ENCOUNTER — TRANSCRIBE ORDERS (OUTPATIENT)
Dept: GENERAL RADIOLOGY | Facility: HOSPITAL | Age: 48
End: 2023-03-02
Payer: MEDICAID

## 2023-03-02 DIAGNOSIS — R07.81 RIB PAIN ON LEFT SIDE: Primary | ICD-10-CM

## 2023-03-02 DIAGNOSIS — R07.81 RIB PAIN ON LEFT SIDE: ICD-10-CM

## 2023-03-02 PROCEDURE — 71111 X-RAY EXAM RIBS/CHEST4/> VWS: CPT

## 2023-03-09 ENCOUNTER — HOSPITAL ENCOUNTER (EMERGENCY)
Facility: HOSPITAL | Age: 48
Discharge: HOME | End: 2023-03-10
Attending: EMERGENCY MEDICINE | Admitting: EMERGENCY MEDICINE
Payer: MEDICAID

## 2023-03-09 ENCOUNTER — APPOINTMENT (OUTPATIENT)
Dept: GENERAL RADIOLOGY | Facility: HOSPITAL | Age: 48
End: 2023-03-09
Payer: MEDICAID

## 2023-03-09 ENCOUNTER — APPOINTMENT (OUTPATIENT)
Dept: CT IMAGING | Facility: HOSPITAL | Age: 48
End: 2023-03-09
Payer: MEDICAID

## 2023-03-09 VITALS
RESPIRATION RATE: 18 BRPM | SYSTOLIC BLOOD PRESSURE: 128 MMHG | HEART RATE: 83 BPM | TEMPERATURE: 98.4 F | HEIGHT: 66 IN | OXYGEN SATURATION: 99 % | BODY MASS INDEX: 29.41 KG/M2 | DIASTOLIC BLOOD PRESSURE: 90 MMHG | WEIGHT: 183 LBS

## 2023-03-09 DIAGNOSIS — R07.81 PAIN IN RIB: ICD-10-CM

## 2023-03-09 DIAGNOSIS — M54.6 ACUTE LEFT-SIDED THORACIC BACK PAIN: Primary | ICD-10-CM

## 2023-03-09 LAB
ALBUMIN SERPL-MCNC: 4.6 G/DL (ref 3.5–5.2)
ALBUMIN/GLOB SERPL: 1.7 G/DL
ALP SERPL-CCNC: 96 U/L (ref 39–117)
ALT SERPL W P-5'-P-CCNC: 19 U/L (ref 1–41)
ANION GAP SERPL CALCULATED.3IONS-SCNC: 12.7 MMOL/L (ref 5–15)
AST SERPL-CCNC: 17 U/L (ref 1–40)
BASOPHILS # BLD AUTO: 0.06 10*3/MM3 (ref 0–0.2)
BASOPHILS NFR BLD AUTO: 0.9 % (ref 0–1.5)
BILIRUB SERPL-MCNC: 0.4 MG/DL (ref 0–1.2)
BUN SERPL-MCNC: 19 MG/DL (ref 6–20)
BUN/CREAT SERPL: 15 (ref 7–25)
CALCIUM SPEC-SCNC: 9.4 MG/DL (ref 8.6–10.5)
CHLORIDE SERPL-SCNC: 104 MMOL/L (ref 98–107)
CO2 SERPL-SCNC: 25.3 MMOL/L (ref 22–29)
CREAT SERPL-MCNC: 1.27 MG/DL (ref 0.76–1.27)
DEPRECATED RDW RBC AUTO: 43.8 FL (ref 37–54)
EGFRCR SERPLBLD CKD-EPI 2021: 70.6 ML/MIN/1.73
EOSINOPHIL # BLD AUTO: 0.12 10*3/MM3 (ref 0–0.4)
EOSINOPHIL NFR BLD AUTO: 1.7 % (ref 0.3–6.2)
ERYTHROCYTE [DISTWIDTH] IN BLOOD BY AUTOMATED COUNT: 14 % (ref 12.3–15.4)
GLOBULIN UR ELPH-MCNC: 2.7 GM/DL
GLUCOSE SERPL-MCNC: 126 MG/DL (ref 65–99)
HCT VFR BLD AUTO: 46.5 % (ref 37.5–51)
HGB BLD-MCNC: 15.8 G/DL (ref 13–17.7)
HOLD SPECIMEN: NORMAL
HOLD SPECIMEN: NORMAL
IMM GRANULOCYTES # BLD AUTO: 0.02 10*3/MM3 (ref 0–0.05)
IMM GRANULOCYTES NFR BLD AUTO: 0.3 % (ref 0–0.5)
LYMPHOCYTES # BLD AUTO: 2.12 10*3/MM3 (ref 0.7–3.1)
LYMPHOCYTES NFR BLD AUTO: 30.2 % (ref 19.6–45.3)
MCH RBC QN AUTO: 29.1 PG (ref 26.6–33)
MCHC RBC AUTO-ENTMCNC: 34 G/DL (ref 31.5–35.7)
MCV RBC AUTO: 85.6 FL (ref 79–97)
MONOCYTES # BLD AUTO: 0.67 10*3/MM3 (ref 0.1–0.9)
MONOCYTES NFR BLD AUTO: 9.5 % (ref 5–12)
NEUTROPHILS NFR BLD AUTO: 4.04 10*3/MM3 (ref 1.7–7)
NEUTROPHILS NFR BLD AUTO: 57.4 % (ref 42.7–76)
NRBC BLD AUTO-RTO: 0 /100 WBC (ref 0–0.2)
PLATELET # BLD AUTO: 317 10*3/MM3 (ref 140–450)
PMV BLD AUTO: 10.4 FL (ref 6–12)
POTASSIUM SERPL-SCNC: 4 MMOL/L (ref 3.5–5.2)
PROT SERPL-MCNC: 7.3 G/DL (ref 6–8.5)
RBC # BLD AUTO: 5.43 10*6/MM3 (ref 4.14–5.8)
SODIUM SERPL-SCNC: 142 MMOL/L (ref 136–145)
TROPONIN T SERPL HS-MCNC: 8 NG/L
WBC NRBC COR # BLD: 7.03 10*3/MM3 (ref 3.4–10.8)
WHOLE BLOOD HOLD COAG: NORMAL
WHOLE BLOOD HOLD SPECIMEN: NORMAL

## 2023-03-09 PROCEDURE — 96374 THER/PROPH/DIAG INJ IV PUSH: CPT

## 2023-03-09 PROCEDURE — 99284 EMERGENCY DEPT VISIT MOD MDM: CPT

## 2023-03-09 PROCEDURE — 71045 X-RAY EXAM CHEST 1 VIEW: CPT

## 2023-03-09 PROCEDURE — 80053 COMPREHEN METABOLIC PANEL: CPT | Performed by: EMERGENCY MEDICINE

## 2023-03-09 PROCEDURE — 25510000001 IOPAMIDOL 61 % SOLUTION: Performed by: EMERGENCY MEDICINE

## 2023-03-09 PROCEDURE — 84484 ASSAY OF TROPONIN QUANT: CPT | Performed by: EMERGENCY MEDICINE

## 2023-03-09 PROCEDURE — 25010000002 KETOROLAC TROMETHAMINE PER 15 MG: Performed by: PHYSICIAN ASSISTANT

## 2023-03-09 PROCEDURE — 71275 CT ANGIOGRAPHY CHEST: CPT

## 2023-03-09 PROCEDURE — 93005 ELECTROCARDIOGRAM TRACING: CPT | Performed by: EMERGENCY MEDICINE

## 2023-03-09 PROCEDURE — 85025 COMPLETE CBC W/AUTO DIFF WBC: CPT | Performed by: EMERGENCY MEDICINE

## 2023-03-09 RX ORDER — KETOROLAC TROMETHAMINE 30 MG/ML
30 INJECTION, SOLUTION INTRAMUSCULAR; INTRAVENOUS ONCE
Status: COMPLETED | OUTPATIENT
Start: 2023-03-09 | End: 2023-03-09

## 2023-03-09 RX ORDER — ASPIRIN 325 MG
325 TABLET ORAL ONCE
Status: COMPLETED | OUTPATIENT
Start: 2023-03-09 | End: 2023-03-09

## 2023-03-09 RX ORDER — CYCLOBENZAPRINE HCL 10 MG
10 TABLET ORAL ONCE
Status: COMPLETED | OUTPATIENT
Start: 2023-03-09 | End: 2023-03-09

## 2023-03-09 RX ORDER — CYCLOBENZAPRINE HCL 10 MG
10 TABLET ORAL 3 TIMES DAILY PRN
Qty: 12 TABLET | Refills: 0 | Status: SHIPPED | OUTPATIENT
Start: 2023-03-09

## 2023-03-09 RX ORDER — SODIUM CHLORIDE 0.9 % (FLUSH) 0.9 %
10 SYRINGE (ML) INJECTION AS NEEDED
Status: DISCONTINUED | OUTPATIENT
Start: 2023-03-09 | End: 2023-03-10 | Stop reason: HOSPADM

## 2023-03-09 RX ADMIN — ASPIRIN 325 MG ORAL TABLET 325 MG: 325 PILL ORAL at 21:32

## 2023-03-09 RX ADMIN — IOPAMIDOL 100 ML: 612 INJECTION, SOLUTION INTRAVENOUS at 22:45

## 2023-03-09 RX ADMIN — CYCLOBENZAPRINE 10 MG: 10 TABLET, FILM COATED ORAL at 23:55

## 2023-03-09 RX ADMIN — KETOROLAC TROMETHAMINE 30 MG: 30 INJECTION, SOLUTION INTRAMUSCULAR; INTRAVENOUS at 21:57

## 2023-03-10 NOTE — ED PROVIDER NOTES
Subjective   History of Present Illness    Review of Systems    Past Medical History:   Diagnosis Date   • Anxiety    • Dizziness    • Essential tremor    • Headache, tension-type    • Heart murmur    • Injury of back    • Renal disorder        Allergies   Allergen Reactions   • Antihistamines, Chlorpheniramine-Type Anxiety       Past Surgical History:   Procedure Laterality Date   • KIDNEY STONE SURGERY     • NECK SURGERY  2018    C5-6 ACDF       Family History   Problem Relation Age of Onset   • Hypertension Mother    • Neuropathy Mother    • Heart disease Mother    • Hypertension Father    • Hypertension Brother    • Diabetes Paternal Grandfather        Social History     Socioeconomic History   • Marital status:    Tobacco Use   • Smoking status: Every Day     Packs/day: 0.25     Years: 10.00     Pack years: 2.50     Types: Cigarettes   • Smokeless tobacco: Never   Vaping Use   • Vaping Use: Never used   Substance and Sexual Activity   • Alcohol use: Never   • Drug use: Never   • Sexual activity: Defer           Objective   Physical Exam    Procedures           ED Course                                           Medical Decision Making  48-year-old presents with rib pain, and back pain.  Pain is worse with activity and movement, deep breath.  He has been dealing with that for weeks and weeks and has discussed with his primary care physician but he states he became acutely in worse pain within the last day.  Differential would include muscle strain, pleurisy, back strain.  Labs were drawn, chest x-ray performed and a CT scan, all results were unremarkable, he was put on Flexeril and recommended to    Acute left-sided thoracic back pain: complicated acute illness or injury  Pain in rib: complicated acute illness or injury  Amount and/or Complexity of Data Reviewed  Labs: ordered.  Radiology: ordered and independent interpretation performed.  ECG/medicine tests: ordered.      Risk  OTC drugs.  Prescription  drug management.          Final diagnoses:   Acute left-sided thoracic back pain   Pain in rib       ED Disposition  ED Disposition     ED Disposition   Discharge    Condition   Stable    Comment   --             [facility] Emergency Department  [address] [address] [phone]    If symptoms worsen         Medication List      New Prescriptions    cyclobenzaprine 10 MG tablet  Commonly known as: FLEXERIL  Take 1 tablet by mouth 3 (Three) Times a Day As Needed for Muscle Spasms.           Where to Get Your Medications      These medications were sent to [facility] [address] [place] [address] - [address] [facility] - [phone]  - [phone]   [address] [facility] [address]    Phone: [phone]   · cyclobenzaprine 10 MG tablet          Jeff Villanueva Jr., PA-C  03/09/23 6757

## 2023-03-24 ENCOUNTER — TELEPHONE (OUTPATIENT)
Dept: NEUROLOGY | Facility: CLINIC | Age: 48
End: 2023-03-24
Payer: MEDICAID

## 2023-03-24 NOTE — TELEPHONE ENCOUNTER
Attempted to call patient. Could not connect.     Advise patient to seek PCP for treatment or concerns before appt. We have him on a wait list and will call if anything opens sooner.     If he is concerned and needs immediate treatment we suggest he goes to the ED.     Okay for hub to relay message.

## 2023-03-24 NOTE — TELEPHONE ENCOUNTER
PATIENT IS FORMER [facility] - SCHEDULED JOSEFA FIGUEROA ON 10/2/23 AND IS ON WAIT LIST.    PT ADVISING HE HAD A FALL 2/23/23 AND HIS TREMOR IS MORE IN HIS HEAD AND FACE. HE SAYS HE IS LOSING HIS BALANCE AND HAS MORE FREQUENT TWITCHING.    PLEASE ADVISE WHAT CAN BE DONE PRIOR TO HIS SCHEDULED APPT.    THANK YOU

## 2023-05-12 ENCOUNTER — TELEPHONE (OUTPATIENT)
Dept: NEUROLOGY | Facility: CLINIC | Age: 48
End: 2023-05-12
Payer: MEDICAID

## 2023-05-12 NOTE — TELEPHONE ENCOUNTER
"PATIENT CALLED TO TRANSFER CARE TO [facility] INSTEAD OF [facility]. SPOKE WITH FELI AT [facility] AND GOT THE \"OKAY\" FROM DENISE.     TRIED TO CALL PATIENT BACK AFTER DISCONNECTION     OKAY TO SCHEDULE PATIENT AT [facility] WITH DENISE HUERTA FOR NEW -ESTABLISHED FOR TREMORS     CANCEL WITH WALLY AT THAT TIME   "

## 2023-05-17 ENCOUNTER — OFFICE VISIT (OUTPATIENT)
Dept: NEUROLOGY | Facility: CLINIC | Age: 48
End: 2023-05-17
Payer: MEDICAID

## 2023-05-17 VITALS
SYSTOLIC BLOOD PRESSURE: 126 MMHG | BODY MASS INDEX: 31.02 KG/M2 | OXYGEN SATURATION: 96 % | HEIGHT: 66 IN | DIASTOLIC BLOOD PRESSURE: 78 MMHG | TEMPERATURE: 98 F | WEIGHT: 193 LBS | HEART RATE: 90 BPM

## 2023-05-17 DIAGNOSIS — R26.89 BALANCE PROBLEM: ICD-10-CM

## 2023-05-17 DIAGNOSIS — R25.1 TREMORS OF NERVOUS SYSTEM: ICD-10-CM

## 2023-05-17 DIAGNOSIS — R25.3 MUSCLE TWITCHING: ICD-10-CM

## 2023-05-17 DIAGNOSIS — G60.9 IDIOPATHIC NEUROPATHY: Primary | ICD-10-CM

## 2023-05-17 RX ORDER — GABAPENTIN 300 MG/1
300 CAPSULE ORAL 3 TIMES DAILY
Qty: 90 CAPSULE | Refills: 2 | Status: SHIPPED | OUTPATIENT
Start: 2023-05-17

## 2023-05-17 NOTE — PROGRESS NOTES
"     Follow Up Office Visit      Patient Name: Bereket Rangel  : 1975   MRN: [id number]     Chief Complaint:    Chief Complaint   Patient presents with   • Follow-up     Tremors/spasms; patient had some relief with Cyclobenzaprine; patient reports spasms on each side of his neck that does effect his breathing at times.  Also c/o difficulty swallowing/feels like food sticks in the back of his neck x 2 months; also c/o balance issues       History of Present Illness: Bereket Rangel is a 48 y.o. male who is here today to follow up with tremors. He was last seen here in the clinic in . Reports he has had tremors \"since birth\".  He says he has never been able to tell him what these were from. He says his mom has some slight tremors. He says he has tremors and twitchin \"all over\". He says he has trouble resting due to his twitching and reports shoulders and face are bothering him most. He has to be careful as he navigates steps. He states he did not take the Primidone \"didn't want to mess with this because insurance would not cover it\". He says sitting still is not as severe. He feels like he has more with movement. He has fallen a couple of times the past 2 months. He has been having some issues with balance and states swallowing has become an issue the past 6 weeks. More so with eating. He has had spasms in the neck area and flexeril did not help with this but allowed him to relax.  He also reports some neuropathy pain in his upper and lower extremities he has some tingling and numbness and he will have burning shooting pain at times.  In the past gabapentin had helped with this.  He works full-time and wants to remain functional and has concerns about MS, Brandee Gehrig's disease, and Parkinson's disease.    Pertinent Medical History:  Tremors, anxiety, cervical spine surgery    Subjective      Review of Systems:   Review of Systems   Constitutional: Positive for activity change.   HENT: Positive for " "trouble swallowing. Negative for voice change.    Eyes: Negative for blurred vision and double vision.   Respiratory: Positive for shortness of breath.    Gastrointestinal: Negative.    Endocrine: Negative for cold intolerance and heat intolerance.   Musculoskeletal: Positive for arthralgias, back pain, gait problem and neck pain.   Neurological: Positive for dizziness, tremors and weakness.   Psychiatric/Behavioral: Positive for sleep disturbance and stress. The patient is nervous/anxious.        I have reviewed and the following portions of the patient's history were updated as appropriate: past family history, past medical history, past social history, past surgical history and problem list.    Medications:     Current Outpatient Medications:   •  cyclobenzaprine (FLEXERIL) 10 MG tablet, Take 1 tablet by mouth 3 (Three) Times a Day As Needed for Muscle Spasms., Disp: 12 tablet, Rfl: 0  •  sildenafil (VIAGRA) 25 MG tablet, TAKE 1 TABLET BY MOUTH ONCE DAILY AS NEEDED APPROXIMATELY 1 HOUR BEFORE SEXUAL ACTIVITY, Disp: , Rfl:     Allergies:   Allergies   Allergen Reactions   • Antihistamines, Chlorpheniramine-Type Anxiety       Objective     Physical Exam:  Vital Signs:   Vitals:    05/17/23 1513   BP: 126/78   Pulse: 90   Temp: 98 °F (36.7 °C)   SpO2: 96%   Weight: 87.5 kg (193 lb)   Height: 167.6 cm (66\")   PainSc:   4   PainLoc: Shoulder     Body mass index is 31.15 kg/m².    Physical Exam  Vitals and nursing note reviewed.   Constitutional:       General: He is not in acute distress.     Appearance: He is well-developed. He is not diaphoretic.   HENT:      Head: Normocephalic and atraumatic.   Eyes:      Extraocular Movements: Extraocular movements intact and EOM normal.      Conjunctiva/sclera: Conjunctivae normal.      Pupils: Pupils are equal, round, and reactive to light.   Pulmonary:      Effort: Pulmonary effort is normal.   Musculoskeletal:         General: Normal range of motion.   Skin:     General: Skin " is warm and dry.      Findings: No rash.   Neurological:      Mental Status: He is alert and oriented to person, place, and time.      Cranial Nerves: Cranial nerves 2-12 are intact.      Coordination: Finger-Nose-Finger Test and Romberg Test normal.      Gait: Tandem walk normal.      Deep Tendon Reflexes:      Reflex Scores:       Bicep reflexes are 2+ on the right side and 2+ on the left side.       Brachioradialis reflexes are 2+ on the right side and 2+ on the left side.       Patellar reflexes are 2+ on the right side and 2+ on the left side.       Achilles reflexes are 2+ on the right side and 2+ on the left side.  Psychiatric:         Mood and Affect: Mood normal.         Speech: Speech normal.         Behavior: Behavior normal.         Thought Content: Thought content normal.         Judgment: Judgment normal.         Neurologic Exam     Mental Status   Oriented to person, place, and time.   Speech: speech is normal     Cranial Nerves   Cranial nerves II through XII intact.     CN III, IV, VI   Pupils are equal, round, and reactive to light.  Extraocular motions are normal.   Right pupil: Size: 3 mm. Reactivity: brisk. Accommodation: intact.   Left pupil: Size: 3 mm. Reactivity: brisk. Accommodation: intact.   CN III: no CN III palsy  CN VI: no CN VI palsy  Nystagmus: none     CN V   Facial sensation intact.     CN VII   Facial expression full, symmetric.     CN VIII   CN VIII normal.     CN IX, X   CN IX normal.   CN X normal.     CN XI   CN XI normal.     CN XII   CN XII normal.   Tongue: not atrophic  Fasciculations: absent  Tongue deviation: none    Motor Exam   Muscle bulk: normal  Overall muscle tone: normal  Right arm tone: normal  Left arm tone: normal  Right arm pronator drift: absent  Right leg tone: normal  Left leg tone: normal    Strength   Right deltoid: 5/5  Left deltoid: 5/5  Right biceps: 5/5  Left biceps: 5/5  Right triceps: 5/5  Left triceps: 5/5  Right wrist flexion: 5/5  Left wrist  flexion: 5/5  Right wrist extension: 5/5  Left wrist extension: 5/5  Right quadriceps: 5/5  Left quadriceps: 5/5  Right hamstrin/5  Left hamstrin/5  Right anterior tibial: 5/5  Left anterior tibial: 5/5  Right posterior tibial: 5/5  Left posterior tibial: 5/5    Sensory Exam   Light touch normal.     Gait, Coordination, and Reflexes     Coordination   Romberg: negative  Finger to nose coordination: normal  Tandem walking coordination: normal    Tremor   Resting tremor: absent  Intention tremor: absent  Action tremor: left arm and right arm    Reflexes   Right brachioradialis: 2+  Left brachioradialis: 2+  Right biceps: 2+  Left biceps: 2+  Right patellar: 2+  Left patellar: 2+  Right achilles: 2+  Left achilles: 2+  Right plantar: normal  Left plantar: normal  Right Liang: absent  Left Liang: absent  Right ankle clonus: absent  Left pendular knee jerk: absentNegative Babinski bilaterally        Assessment / Plan      Assessment/Plan:   Diagnoses and all orders for this visit:    1. Idiopathic neuropathy (Primary)    2. Tremors of nervous system  -     MRI Brain With & Without Contrast; Future    3. Muscle twitching    4. Balance problem    5. BMI 31.0-31.9,adult       We will schedule patient for MRI of the brain with and without contrast.  He has ongoing tremors since birth but states they have progressed this year and he has fallen twice in the last couple of months and is concerned that there is something else going on.  He denies any family history of neurological issues however his mom does have some undiagnosed tremors.  We will rule out any pathology.  In the meantime we will start him on low-dose gabapentin for neuropathy.  He did have an EMG on 2021 which showed some mild median on the left.  No indication of denervation or motor neuron disease.  He is going to hold the Flexeril for now while he starts the gabapentin. Indications and side effects discussed with patient he verbalizes  understanding.  Patient will call in the interim if he has any questions or concerns or changes.  He is also going to follow-up with his PCP regarding his neck pain and difficulty swallowing to make sure that there is no new cervical spine pathology.  Greg report #[id number] was reviewed today and is appropriate.    Follow Up:   Return in about 3 months (around 8/17/2023) for after MRI.    JACINTO Mir, FNP-BC  [facility] Neurology and Sleep Medicine

## 2023-06-06 ENCOUNTER — TRANSCRIBE ORDERS (OUTPATIENT)
Dept: ADMINISTRATIVE | Facility: HOSPITAL | Age: 48
End: 2023-06-06
Payer: MEDICAID

## 2023-06-06 DIAGNOSIS — M54.12 CERVICAL RADICULOPATHY: Primary | ICD-10-CM

## 2024-08-06 NOTE — TELEPHONE ENCOUNTER
NOTIFIED PT OF TEST RESULTS.  
Provider: CLEM CEE    Caller: PT    Relationship to Patient: SELF    Pharmacy: [facility] [place]    Phone Number: [phone]    Reason for Call: PATIENT IS WORRIED ABOUT EMG TEST RESULTS AND WOULD LIKE SOMEONE TO CALL AND GO OVER THE RESULTS WITH HIM.    When was the patient last seen: 2-2-21    
English

## (undated) DEVICE — Device

## (undated) DEVICE — BLADE ES ELASTOMERIC COAT INSUL DURABLE BEND UPTO 90DEG

## (undated) DEVICE — DRAIN SURG 10FR L1/8IN DIA3.2MM SIL CHN RND HUBLESS FULL

## (undated) DEVICE — SUTURE MCRYL + SZ 4-0 L18IN ABSRB UD L19MM PS-2 3/8 CIR MCP496G

## (undated) DEVICE — SUTURE VCRL + SZ 3-0 L18IN ABSRB UD SH 1/2 CIR TAPERCUT NDL VCP864D

## (undated) DEVICE — LIMB HOLDER, WRIST/ANKLE: Brand: DEROYAL

## (undated) DEVICE — TZ MEDICAL TITANIUM DISTRACTION PINS 14MM: Brand: TZ MEDICAL TITANIUM DISTRACTION PINS

## (undated) DEVICE — GOWN SIRUS NONREIN LG W/TWL: Brand: MEDLINE INDUSTRIES, INC.

## (undated) DEVICE — TOOL 14MH30 LEGEND 14CM 3MM: Brand: MIDAS REX ™

## (undated) DEVICE — GLOVE ORANGE PI 7 1/2   MSG9075

## (undated) DEVICE — SOLUTION IV IRRIG POUR BRL 0.9% SODIUM CHL 2F7124

## (undated) DEVICE — 1010 S-DRAPE TOWEL DRAPE 10/BX: Brand: STERI-DRAPE™